# Patient Record
Sex: MALE | Race: WHITE | Employment: OTHER
[De-identification: names, ages, dates, MRNs, and addresses within clinical notes are randomized per-mention and may not be internally consistent; named-entity substitution may affect disease eponyms.]

---

## 2017-01-17 ENCOUNTER — TELEPHONE (OUTPATIENT)
Dept: CASE MANAGEMENT | Age: 67
End: 2017-01-17

## 2017-07-24 ENCOUNTER — TELEPHONE (OUTPATIENT)
Dept: FAMILY MEDICINE CLINIC | Age: 67
End: 2017-07-24

## 2017-07-24 ENCOUNTER — TELEPHONE (OUTPATIENT)
Dept: LAB | Age: 67
End: 2017-07-24

## 2017-07-24 ENCOUNTER — TELEPHONE (OUTPATIENT)
Dept: CASE MANAGEMENT | Age: 67
End: 2017-07-24

## 2017-07-25 ENCOUNTER — TELEPHONE (OUTPATIENT)
Dept: CASE MANAGEMENT | Age: 67
End: 2017-07-25

## 2017-08-01 ENCOUNTER — TELEPHONE (OUTPATIENT)
Dept: CASE MANAGEMENT | Age: 67
End: 2017-08-01

## 2018-08-06 ENCOUNTER — TELEPHONE (OUTPATIENT)
Dept: ONCOLOGY | Age: 68
End: 2018-08-06

## 2018-08-06 DIAGNOSIS — Z87.891 PERSONAL HISTORY OF TOBACCO USE: Primary | ICD-10-CM

## 2018-08-06 NOTE — TELEPHONE ENCOUNTER
Our records indicate that your patient is overdue for their annual lung cancer screening follow up testing. For your convenience, we have pended the order for the scan for you. If you do not agree with the need for the test, please cancel the order and let us know.      Sincerely,    [id number] [facility]

## 2018-08-29 ENCOUNTER — HOSPITAL ENCOUNTER (OUTPATIENT)
Dept: CT IMAGING | Age: 68
Discharge: HOME | End: 2018-08-31
Payer: MEDICARE

## 2018-08-29 DIAGNOSIS — Z87.891 PERSONAL HISTORY OF TOBACCO USE: ICD-10-CM

## 2018-08-29 PROCEDURE — G0297 LDCT FOR LUNG CA SCREEN: HCPCS

## 2018-08-31 ENCOUNTER — OFFICE VISIT (OUTPATIENT)
Dept: FAMILY MEDICINE CLINIC | Age: 68
End: 2018-08-31
Payer: MEDICARE

## 2018-08-31 VITALS
RESPIRATION RATE: 16 BRPM | HEIGHT: 70 IN | HEART RATE: 79 BPM | BODY MASS INDEX: 24.94 KG/M2 | OXYGEN SATURATION: 97 % | SYSTOLIC BLOOD PRESSURE: 130 MMHG | WEIGHT: 174.2 LBS | DIASTOLIC BLOOD PRESSURE: 78 MMHG | TEMPERATURE: 97.4 F

## 2018-08-31 DIAGNOSIS — F17.200 SMOKER: ICD-10-CM

## 2018-08-31 DIAGNOSIS — R06.02 SHORTNESS OF BREATH: Primary | ICD-10-CM

## 2018-08-31 DIAGNOSIS — I25.2 HISTORY OF MI (MYOCARDIAL INFARCTION): ICD-10-CM

## 2018-08-31 DIAGNOSIS — I45.10 RIGHT BUNDLE BRANCH BLOCK (RBBB) DETERMINED BY ELECTROCARDIOGRAPHY: ICD-10-CM

## 2018-08-31 PROCEDURE — 1101F PT FALLS ASSESS-DOCD LE1/YR: CPT | Performed by: NURSE PRACTITIONER

## 2018-08-31 PROCEDURE — 99214 OFFICE O/P EST MOD 30 MIN: CPT | Performed by: NURSE PRACTITIONER

## 2018-08-31 PROCEDURE — 4004F PT TOBACCO SCREEN RCVD TLK: CPT | Performed by: NURSE PRACTITIONER

## 2018-08-31 PROCEDURE — G8427 DOCREV CUR MEDS BY ELIG CLIN: HCPCS | Performed by: NURSE PRACTITIONER

## 2018-08-31 PROCEDURE — 4040F PNEUMOC VAC/ADMIN/RCVD: CPT | Performed by: NURSE PRACTITIONER

## 2018-08-31 PROCEDURE — 3017F COLORECTAL CA SCREEN DOC REV: CPT | Performed by: NURSE PRACTITIONER

## 2018-08-31 PROCEDURE — 1123F ACP DISCUSS/DSCN MKR DOCD: CPT | Performed by: NURSE PRACTITIONER

## 2018-08-31 PROCEDURE — G8419 CALC BMI OUT NRM PARAM NOF/U: HCPCS | Performed by: NURSE PRACTITIONER

## 2018-08-31 RX ORDER — BUPROPION HYDROCHLORIDE 150 MG/1
TABLET, EXTENDED RELEASE ORAL
Qty: 60 TABLET | Refills: 3 | Status: SHIPPED | OUTPATIENT
Start: 2018-08-31 | End: 2019-04-12 | Stop reason: SDUPTHER

## 2018-08-31 RX ORDER — ALBUTEROL SULFATE 90 UG/1
2 AEROSOL, METERED RESPIRATORY (INHALATION) EVERY 6 HOURS PRN
Qty: 1 INHALER | Refills: 3 | Status: SHIPPED | OUTPATIENT
Start: 2018-08-31

## 2018-08-31 ASSESSMENT — ENCOUNTER SYMPTOMS
CHOKING: 0
COUGH: 1
GASTROINTESTINAL NEGATIVE: 1
SHORTNESS OF BREATH: 1
CHEST TIGHTNESS: 0
SPUTUM PRODUCTION: 1

## 2018-08-31 NOTE — PATIENT INSTRUCTIONS
Patient Education        Stopping Smoking: Care Instructions  Your Care Instructions  Cigarette smokers crave the nicotine in cigarettes. Giving it up is much harder than simply changing a habit. Your body has to stop craving the nicotine. It is hard to quit, but you can do it. There are many tools that people use to quit smoking. You may find that combining tools works best for you. There are several steps to quitting. First you get ready to quit. Then you get support to help you. After that, you learn new skills and behaviors to become a nonsmoker. For many people, a necessary step is getting and using medicine. Your doctor will help you set up the plan that best meets your needs. You may want to attend a smoking cessation program to help you quit smoking. When you choose a program, look for one that has proven success. Ask your doctor for ideas. You will greatly increase your chances of success if you take medicine as well as get counseling or join a cessation program.  Some of the changes you feel when you first quit tobacco are uncomfortable. Your body will miss the nicotine at first, and you may feel short-tempered and grumpy. You may have trouble sleeping or concentrating. Medicine can help you deal with these symptoms. You may struggle with changing your smoking habits and rituals. The last step is the tricky one: Be prepared for the smoking urge to continue for a time. This is a lot to deal with, but keep at it. You will feel better. Follow-up care is a key part of your treatment and safety. Be sure to make and go to all appointments, and call your doctor if you are having problems. It's also a good idea to know your test results and keep a list of the medicines you take. How can you care for yourself at home? · Ask your family, friends, and coworkers for support. You have a better chance of quitting if you have help and support.   · Join a support group, such as Nicotine Anonymous, for people who are trying to quit smoking. · Consider signing up for a smoking cessation program, such as the American Lung Association's Freedom from Smoking program.  · Get text messaging support. Go to the website at www.smokefree. gov to sign up for the  program.  · Set a quit date. Pick your date carefully so that it is not right in the middle of a big deadline or stressful time. Once you quit, do not even take a puff. Get rid of all ashtrays and lighters after your last cigarette. Clean your house and your clothes so that they do not smell of smoke. · Learn how to be a nonsmoker. Think about ways you can avoid those things that make you reach for a cigarette. ¨ Avoid situations that put you at greatest risk for smoking. For some people, it is hard to have a drink with friends without smoking. For others, they might skip a coffee break with coworkers who smoke. ¨ Change your daily routine. Take a different route to work or eat a meal in a different place. · Cut down on stress. Calm yourself or release tension by doing an activity you enjoy, such as reading a book, taking a hot bath, or gardening. · Talk to your doctor or pharmacist about nicotine replacement therapy, which replaces the nicotine in your body. You still get nicotine but you do not use tobacco. Nicotine replacement products help you slowly reduce the amount of nicotine you need. These products come in several forms, many of them available over-the-counter:  ¨ Nicotine patches  ¨ Nicotine gum and lozenges  ¨ Nicotine inhaler  · Ask your doctor about bupropion (Wellbutrin) or varenicline (Chantix), which are prescription medicines. They do not contain nicotine. They help you by reducing withdrawal symptoms, such as stress and anxiety. · Some people find hypnosis, acupuncture, and massage helpful for ending the smoking habit. · Eat a healthy diet and get regular exercise.  Having healthy habits will help your body move past its craving for plenty of rest and sleep. · Take your medicines exactly as prescribed. Call your doctor if you think you are having a problem with your medicine. · Find healthy ways to deal with stress. ¨ Exercise daily. ¨ Get plenty of sleep. ¨ Eat regularly and well. When should you call for help? Call 911 anytime you think you may need emergency care. For example, call if:    · You have severe shortness of breath.     · You have symptoms of a heart attack. These may include:  ¨ Chest pain or pressure, or a strange feeling in the chest.  ¨ Sweating. ¨ Shortness of breath. ¨ Nausea or vomiting. ¨ Pain, pressure, or a strange feeling in the back, neck, jaw, or upper belly or in one or both shoulders or arms. ¨ Lightheadedness or sudden weakness. ¨ A fast or irregular heartbeat. After you call 911, the  may tell you to chew 1 adult-strength or 2 to 4 low-dose aspirin. Wait for an ambulance. Do not try to drive yourself.    Call your doctor now or seek immediate medical care if:    · Your shortness of breath gets worse or you start to wheeze. Wheezing is a high-pitched sound when you breathe.     · You wake up at night out of breath or have to prop your head up on several pillows to breathe.     · You are short of breath after only light activity or while at rest.    Watch closely for changes in your health, and be sure to contact your doctor if:    · You do not get better over the next 1 to 2 days. Where can you learn more? Go to https://[employer]. org and sign in to your [facility] account. Enter S780 in the [employer] box to learn more about \"Shortness of Breath: Care Instructions. \"     If you do not have an account, please click on the \"Sign Up Now\" link. Current as of: December 6, 2017  Content Version: 11.7  © 1752-4453 [employer]. Care instructions adapted under license by [facility] ([facility]).  If you have questions about a medical condition or this instruction,

## 2018-08-31 NOTE — PROGRESS NOTES
Opinion     Referral Reason:   Specialty Services Required     Requested Specialty:   Cardiology     Number of Visits Requested:   1    EKG 12 Lead     Order Specific Question:   Reason for Exam?     Answer:   Shortness of Breath       I discussed with patient and his wife going to the ER given his symptoms and medical history. Patient refuses. I discussed that doing an EKG today does not rule out a myocardial infarction and he again refuses. Patient will see cardiology in [facility]. He is going to try and quit smoking. We will further address his SOB once he has had his cardiology evaluation. Patient given educational materials - see patient instructions. Discussed use, benefit, and side effects of prescribed medications. All patient questions answered. Pt voiced understanding. Reviewed health maintenance. Instructed to continue current medications, diet and exercise.       Electronically signed by AIMEE Sommer CNP on 8/31/2018 at 5:39 PM          AIMEE Sommer CNP

## 2018-09-10 ENCOUNTER — OFFICE VISIT (OUTPATIENT)
Dept: CARDIOLOGY CLINIC | Age: 68
End: 2018-09-10
Payer: MEDICARE

## 2018-09-10 VITALS
BODY MASS INDEX: 25.2 KG/M2 | WEIGHT: 176 LBS | HEIGHT: 70 IN | SYSTOLIC BLOOD PRESSURE: 148 MMHG | DIASTOLIC BLOOD PRESSURE: 82 MMHG | HEART RATE: 96 BPM

## 2018-09-10 DIAGNOSIS — R07.89 OTHER CHEST PAIN: ICD-10-CM

## 2018-09-10 DIAGNOSIS — I45.10 RBBB: ICD-10-CM

## 2018-09-10 DIAGNOSIS — R94.31 ABNORMAL ECG: Primary | ICD-10-CM

## 2018-09-10 DIAGNOSIS — R06.09 DOE (DYSPNEA ON EXERTION): ICD-10-CM

## 2018-09-10 PROCEDURE — 99203 OFFICE O/P NEW LOW 30 MIN: CPT | Performed by: INTERNAL MEDICINE

## 2018-09-10 NOTE — PROGRESS NOTES
Cardiology Consultation  [facility] [place], Reji Wilkinson)    09/10/18    Patient is here to establish care for LEDESMA, CPOE    HPI and Chief Complaint:  Bharat Martinez.  is doing well from a cardiac standpoint. Average to Good functional capacity with recent decline in functional capacity. Has been getting very short of breath and has chest tightness, especially with exertion. REVIEW OF SYSTEMS:    · Constitutional: there has been no unanticipated weight loss. There's been No change in energy level, No change in activity level. · Eyes: No visual changes or diplopia. No scleral icterus. · ENT: No Headaches, hearing loss or vertigo. No mouth sores or sore throat. · Cardiovascular: CPOE, LEDESMA  · Respiratory: SOB, LEDESMA  · Gastrointestinal: No abdominal pain, appetite loss, blood in stools. No change in bowel or bladder habits. · Genitourinary: No dysuria, trouble voiding, or hematuria. · Musculoskeletal:  No gait disturbance, No weakness or joint complaints. · Integumentary: No rash or pruritis. · Neurological: No headache, diplopia, change in muscle strength, numbness or tingling. No change in gait, balance, coordination, mood, affect, memory, mentation, behavior. · Psychiatric: No new anxiety or depression. · Endocrine: No temperature intolerance. No excessive thirst, fluid intake, or urination. No tremor. · Hematologic/Lymphatic: No abnormal bruising or bleeding, blood clots or swollen lymph nodes. · Allergic/Immunologic: No nasal congestion or hives. Physical Exam:   Vitals: BP (!) 148/82   Pulse 96   Ht 5' 10\" (1.778 m)   Wt 176 lb (79.8 kg)   BMI 25.25 kg/m²   General appearance: alert and cooperative with exam  HEENT: Head: Normocephalic, no lesions, without obvious abnormality.   Neck: no carotid bruit, no JVD  Lungs: clear to auscultation bilaterally  Heart: regular rate and rhythm, S1, S2 normal, no murmur, click, rub or gallop  Abdomen: soft, non-tender;

## 2018-09-12 LAB
LV EF: 69 %
LVEF MODALITY: NORMAL

## 2018-09-13 ENCOUNTER — TELEPHONE (OUTPATIENT)
Dept: CARDIOLOGY CLINIC | Age: 68
End: 2018-09-13

## 2018-09-13 DIAGNOSIS — R06.09 DOE (DYSPNEA ON EXERTION): ICD-10-CM

## 2018-09-13 DIAGNOSIS — I45.10 RBBB: ICD-10-CM

## 2018-09-13 DIAGNOSIS — R94.31 ABNORMAL ECG: ICD-10-CM

## 2018-09-13 DIAGNOSIS — R07.89 OTHER CHEST PAIN: ICD-10-CM

## 2018-09-18 NOTE — TELEPHONE ENCOUNTER
Pt notified of normal stress test results. States understanding. Denies complaints. Await PFT results done the same day. Plan is to see Dr. Aj Sanders. Records sent per fax.

## 2018-10-01 ENCOUNTER — OFFICE VISIT (OUTPATIENT)
Dept: FAMILY MEDICINE CLINIC | Age: 68
End: 2018-10-01
Payer: MEDICARE

## 2018-10-01 VITALS
RESPIRATION RATE: 20 BRPM | DIASTOLIC BLOOD PRESSURE: 70 MMHG | OXYGEN SATURATION: 93 % | WEIGHT: 175.4 LBS | BODY MASS INDEX: 25.17 KG/M2 | SYSTOLIC BLOOD PRESSURE: 120 MMHG | HEART RATE: 93 BPM | TEMPERATURE: 97.2 F

## 2018-10-01 DIAGNOSIS — J44.9 COPD WITH ASTHMA (HCC): Primary | ICD-10-CM

## 2018-10-01 PROCEDURE — 1123F ACP DISCUSS/DSCN MKR DOCD: CPT | Performed by: NURSE PRACTITIONER

## 2018-10-01 PROCEDURE — 4040F PNEUMOC VAC/ADMIN/RCVD: CPT | Performed by: NURSE PRACTITIONER

## 2018-10-01 PROCEDURE — G8926 SPIRO NO PERF OR DOC: HCPCS | Performed by: NURSE PRACTITIONER

## 2018-10-01 PROCEDURE — 4004F PT TOBACCO SCREEN RCVD TLK: CPT | Performed by: NURSE PRACTITIONER

## 2018-10-01 PROCEDURE — G8419 CALC BMI OUT NRM PARAM NOF/U: HCPCS | Performed by: NURSE PRACTITIONER

## 2018-10-01 PROCEDURE — G8427 DOCREV CUR MEDS BY ELIG CLIN: HCPCS | Performed by: NURSE PRACTITIONER

## 2018-10-01 PROCEDURE — 1101F PT FALLS ASSESS-DOCD LE1/YR: CPT | Performed by: NURSE PRACTITIONER

## 2018-10-01 PROCEDURE — 3017F COLORECTAL CA SCREEN DOC REV: CPT | Performed by: NURSE PRACTITIONER

## 2018-10-01 PROCEDURE — 99213 OFFICE O/P EST LOW 20 MIN: CPT | Performed by: NURSE PRACTITIONER

## 2018-10-01 PROCEDURE — G8484 FLU IMMUNIZE NO ADMIN: HCPCS | Performed by: NURSE PRACTITIONER

## 2018-10-01 PROCEDURE — 3023F SPIROM DOC REV: CPT | Performed by: NURSE PRACTITIONER

## 2018-10-01 ASSESSMENT — ENCOUNTER SYMPTOMS
SINUS PAIN: 0
ALLERGIC/IMMUNOLOGIC NEGATIVE: 1
RHINORRHEA: 0
SINUS PRESSURE: 0
ABDOMINAL PAIN: 0
SHORTNESS OF BREATH: 1
CHEST TIGHTNESS: 1
EYES NEGATIVE: 1
COUGH: 1

## 2018-10-18 ENCOUNTER — OFFICE VISIT (OUTPATIENT)
Dept: CARDIOLOGY CLINIC | Age: 68
End: 2018-10-18
Payer: MEDICARE

## 2018-10-18 VITALS
SYSTOLIC BLOOD PRESSURE: 134 MMHG | BODY MASS INDEX: 24.91 KG/M2 | HEIGHT: 70 IN | HEART RATE: 84 BPM | WEIGHT: 174 LBS | DIASTOLIC BLOOD PRESSURE: 80 MMHG

## 2018-10-18 DIAGNOSIS — R06.02 SOB (SHORTNESS OF BREATH): ICD-10-CM

## 2018-10-18 DIAGNOSIS — I73.9 CLAUDICATION (HCC): Primary | ICD-10-CM

## 2018-10-18 PROCEDURE — 99213 OFFICE O/P EST LOW 20 MIN: CPT | Performed by: INTERNAL MEDICINE

## 2018-10-18 RX ORDER — ALPRAZOLAM 0.25 MG/1
0.25 TABLET ORAL NIGHTLY PRN
COMMUNITY
End: 2019-09-16 | Stop reason: ALTCHOICE

## 2018-10-18 NOTE — PROGRESS NOTES
Provider, MD   verapamil (CALAN-SR) 180 MG CR tablet Take 180 mg by mouth daily. Historical Provider, MD   lovastatin (MEVACOR) 10 MG tablet Take 10 mg by mouth daily. Historical Provider, MD   aspirin 81 MG tablet Take 81 mg by mouth daily. Historical Provider, MD       Allergies:  Diclofenac sodium    Social History:   reports that he has been smoking Cigarettes. He has a 20.00 pack-year smoking history. He has never used smokeless tobacco. He reports that he drinks about 7.0 oz of alcohol per week . REVIEW OF SYSTEMS:  CONSTITUTIONAL:NEGATIVE  HEENT:NEG  Cardiovascular: No chest pain, Yes dyspnea on exertion, No palpitations. Lower extremity edema: No  RESPIRATORY: LEDESMA  GASTROINTESTINAL:  positive for reflux  GENITOURINARY:  negative  INTEGUMENT:  negative  MUSCULOSKELETAL:  positive for  pain  NEUROLOGICAL:  negative    PHYSICAL EXAM:      There were no vitals taken for this visit. HEENT: PERRL, no cervical lymphadenopathy. No masses palpable. Cardiovascular:  · The apical impulse is not displaced  · Heart  Sounds:RRR, S4  · Jugular venous pulsation Normal  · The carotid upstroke is NL  · Peripheral pulses are symmetrical and full  Respiratory: Good respiratory effort. On auscultation: clear to auscultation bilaterally  Abdomen:  · No masses or tenderness  · Bowel sounds present  Extremities:  ·  No Cyanosis or Clubbing  ·  Lower extremity edema: No  Skin: Warm and dry    Cardiac data:      Labs:     CBC: No results for input(s): WBC, HGB, HCT, PLT in the last 72 hours. BMP: No results for input(s): NA, K, CO2, BUN, CREATININE, LABGLOM, GLUCOSE in the last 72 hours. PT/INR: No results for input(s): PROTIME, INR in the last 72 hours. FASTING LIPID PANEL:No results found for: HDL, LDLDIRECT, LDLCALC, TRIG  LIVER PROFILE:No results for input(s): AST, ALT, LABALBU in the last 72 hours. IMPRESSION:    1. LEDESMA, NEGATIVE STRESS TEST 9/20108  2. COPD  3. RBBB  4. HTN  5. Obesity   6.  Tobacco

## 2018-11-05 ENCOUNTER — TELEPHONE (OUTPATIENT)
Dept: CARDIOLOGY CLINIC | Age: 68
End: 2018-11-05

## 2018-11-05 NOTE — TELEPHONE ENCOUNTER
Pt notified of normal krupa leg Duplex US and stress test results. He states understanding and confirmed follow up cardio appt on 11/29/18 at [facility].

## 2018-11-29 ENCOUNTER — OFFICE VISIT (OUTPATIENT)
Dept: CARDIOLOGY CLINIC | Age: 68
End: 2018-11-29
Payer: MEDICARE

## 2018-11-29 VITALS
BODY MASS INDEX: 25.05 KG/M2 | HEIGHT: 70 IN | SYSTOLIC BLOOD PRESSURE: 120 MMHG | WEIGHT: 175 LBS | DIASTOLIC BLOOD PRESSURE: 80 MMHG | HEART RATE: 102 BPM

## 2018-11-29 DIAGNOSIS — R06.09 DOE (DYSPNEA ON EXERTION): ICD-10-CM

## 2018-11-29 DIAGNOSIS — R94.31 ABNORMAL ECG: ICD-10-CM

## 2018-11-29 DIAGNOSIS — R07.89 OTHER CHEST PAIN: ICD-10-CM

## 2018-11-29 DIAGNOSIS — I45.10 RBBB: Primary | ICD-10-CM

## 2018-11-29 DIAGNOSIS — J44.9 CHRONIC OBSTRUCTIVE PULMONARY DISEASE, UNSPECIFIED COPD TYPE (HCC): ICD-10-CM

## 2018-11-29 PROCEDURE — 99213 OFFICE O/P EST LOW 20 MIN: CPT | Performed by: INTERNAL MEDICINE

## 2018-11-29 RX ORDER — THEOPHYLLINE 300 MG/1
1 TABLET, EXTENDED RELEASE ORAL 2 TIMES DAILY
COMMUNITY

## 2018-11-29 NOTE — PROGRESS NOTES
Today's Date: 11/29/2018  Patient Name: Tra Avendaño. Patient's age: 77 y.o., 1950          The patient is a 77 y.o.  male is in the office for f/u after a negative stress test on 9/12/2018. He is scheduled to see pulmonary later. No chest pain. He has claudication. Past Medical History:   has a past medical history of COPD (chronic obstructive pulmonary disease) ([facility] Fracture of ulnar styloid; Headache(784.0); Hyperlipidemia; Hypertension; MI (myocardial infarction) ([facility] Neck pain, chronic; Posttraumatic stress disorder; and Tremor. Past Surgical History:   has a past surgical history that includes Appendectomy; Vasectomy; Tonsillectomy; Elbow surgery (Left, 1999); Cardiac catheterization (7/10); and Colonoscopy (2015). Home Medications:    Prior to Admission medications    Medication Sig Start Date End Date Taking? Authorizing Provider   theophylline (THEODUR) 300 MG extended release tablet Take 1 tablet by mouth 2 times daily   Yes Historical Provider, MD   Budesonide-Formoterol Fumarate (SYMBICORT IN) Inhale 2 puffs into the lungs daily   Yes Historical Provider, MD   ALPRAZolam (XANAX) 0.25 MG tablet Take 0.25 mg by mouth nightly as needed for Sleep. .   Yes Historical Provider, MD   albuterol sulfate HFA (PROAIR HFA) 108 (90 Base) MCG/ACT inhaler Inhale 2 puffs into the lungs every 6 hours as needed for Wheezing or Shortness of Breath 8/31/18  Yes AIMEE Graham CNP   buPROPion [facility] SR) 150 MG extended release tablet Take 1 PO daily for 3 days then increase to 1 PO BID. Stop smoking after being on the medication for 5-7 days.  8/31/18  Yes AIMEE Graham CNP   Cyanocobalamin (VITAMIN B-12) 1000 MCG extended release tablet Take 1,500 mcg by mouth daily   Yes Historical Provider, MD   Multiple Vitamins-Minerals (MULTIVITAMIN & MINERAL PO) Take 1 tablet by mouth Indications: When he remembers   Yes Historical Provider, MD   MELOXICAM CREATININE, LABGLOM, GLUCOSE in the last 72 hours. PT/INR: No results for input(s): PROTIME, INR in the last 72 hours. FASTING LIPID PANEL:No results found for: HDL, LDLDIRECT, LDLCALC, TRIG  LIVER PROFILE:No results for input(s): AST, ALT, LABALBU in the last 72 hours. Patient Active Problem List   Diagnosis    Abnormal ECG    RBBB    Other chest pain    LEDESMA (dyspnea on exertion)     Echo 11/5/18   The Ejection Fraction is 60-65%.     Grade I diastolic dysfunction.     The right ventricle structure and function is normal.     Mitral regurgitation is trace.     There is mild tricuspid regurgitation.     The aortic valve is mildly sclerotic.     There is mild aortic root dilatation.     There is no pericardial effusion.      NINO 11/13/18  Normal    Stress 9/12/18  Normal perfusion  EF 69%    PFTs: 9/12/18  Mild obstructive disease and Asthmatic response      IMPRESSION & RECOMMENDATIONS:  1. LEDESMA- has mild asthma/copd. On meds and inhales now. Feels better. Seeing Dr. Joel Bartlett for this. 2. NEGATIVE STRESS TEST 9/20108  3. COPD  4. RBBB  5. HTN  6. Obesity   7. Tobacco abuse  8. Hyperlipidemia  9. Claudication- neg nino      Thank you for allowing me to participate in the care of this patient, please do not hesitate to call if you have any questions. Amadeo Alarcon, [id number] [facility]  [facility]  [phone]

## 2019-04-12 ENCOUNTER — OFFICE VISIT (OUTPATIENT)
Dept: FAMILY MEDICINE CLINIC | Age: 69
End: 2019-04-12
Payer: MEDICARE

## 2019-04-12 ENCOUNTER — HOSPITAL ENCOUNTER (OUTPATIENT)
Age: 69
Setting detail: SPECIMEN
Discharge: HOME | End: 2019-04-12
Payer: MEDICARE

## 2019-04-12 VITALS
WEIGHT: 173.2 LBS | OXYGEN SATURATION: 95 % | DIASTOLIC BLOOD PRESSURE: 78 MMHG | TEMPERATURE: 97.4 F | RESPIRATION RATE: 16 BRPM | BODY MASS INDEX: 24.85 KG/M2 | HEART RATE: 104 BPM | SYSTOLIC BLOOD PRESSURE: 136 MMHG

## 2019-04-12 DIAGNOSIS — L71.9 ROSACEA, UNSPECIFIED: Primary | ICD-10-CM

## 2019-04-12 DIAGNOSIS — F32.A DEPRESSION, UNSPECIFIED DEPRESSION TYPE: ICD-10-CM

## 2019-04-12 DIAGNOSIS — M79.671 BILATERAL FOOT PAIN: ICD-10-CM

## 2019-04-12 DIAGNOSIS — M79.672 BILATERAL FOOT PAIN: ICD-10-CM

## 2019-04-12 DIAGNOSIS — F17.200 SMOKER: ICD-10-CM

## 2019-04-12 DIAGNOSIS — J44.9 CHRONIC OBSTRUCTIVE PULMONARY DISEASE, UNSPECIFIED COPD TYPE (HCC): ICD-10-CM

## 2019-04-12 LAB
ABSOLUTE EOS #: 0.5 K/UL (ref 0–0.4)
ABSOLUTE IMMATURE GRANULOCYTE: ABNORMAL K/UL (ref 0–0.3)
ABSOLUTE LYMPH #: 2 K/UL (ref 1–4.8)
ABSOLUTE MONO #: 0.8 K/UL (ref 0.1–1.2)
ALBUMIN SERPL-MCNC: 4.8 G/DL (ref 3.5–5.2)
ALBUMIN/GLOBULIN RATIO: 1.7 (ref 1–2.5)
ALP BLD-CCNC: 72 U/L (ref 40–129)
ALT SERPL-CCNC: 15 U/L (ref 5–41)
ANION GAP SERPL CALCULATED.3IONS-SCNC: 12 MMOL/L (ref 9–17)
AST SERPL-CCNC: 18 U/L
BASOPHILS # BLD: 1 % (ref 0–2)
BASOPHILS ABSOLUTE: 0.1 K/UL (ref 0–0.2)
BILIRUB SERPL-MCNC: 0.43 MG/DL (ref 0.3–1.2)
BUN BLDV-MCNC: 7 MG/DL (ref 8–23)
BUN/CREAT BLD: 8 (ref 9–20)
CALCIUM SERPL-MCNC: 10 MG/DL (ref 8.6–10.4)
CHLORIDE BLD-SCNC: 99 MMOL/L (ref 98–107)
CO2: 28 MMOL/L (ref 20–31)
CREAT SERPL-MCNC: 0.85 MG/DL (ref 0.7–1.2)
DIFFERENTIAL TYPE: ABNORMAL
EOSINOPHILS RELATIVE PERCENT: 6 % (ref 1–8)
GFR AFRICAN AMERICAN: >60 ML/MIN
GFR NON-AFRICAN AMERICAN: >60 ML/MIN
GFR SERPL CREATININE-BSD FRML MDRD: ABNORMAL ML/MIN/{1.73_M2}
GFR SERPL CREATININE-BSD FRML MDRD: ABNORMAL ML/MIN/{1.73_M2}
GLUCOSE BLD-MCNC: 117 MG/DL (ref 70–99)
HCT VFR BLD CALC: 50.5 % (ref 41–53)
HEMOGLOBIN: 16.2 G/DL (ref 13.5–17.5)
IMMATURE GRANULOCYTES: ABNORMAL %
LYMPHOCYTES # BLD: 23 % (ref 15–43)
MCH RBC QN AUTO: 31.1 PG (ref 26–34)
MCHC RBC AUTO-ENTMCNC: 32.2 G/DL (ref 31–37)
MCV RBC AUTO: 96.6 FL (ref 80–100)
MONOCYTES # BLD: 9 % (ref 6–14)
NRBC AUTOMATED: ABNORMAL PER 100 WBC
PDW BLD-RTO: 14.1 % (ref 11–14.5)
PLATELET # BLD: 564 K/UL (ref 140–450)
PLATELET ESTIMATE: ABNORMAL
PMV BLD AUTO: 7.6 FL (ref 6–12)
POTASSIUM SERPL-SCNC: 4.8 MMOL/L (ref 3.7–5.3)
RBC # BLD: 5.23 M/UL (ref 4.5–5.9)
RBC # BLD: ABNORMAL 10*6/UL
SEG NEUTROPHILS: 61 % (ref 44–74)
SEGMENTED NEUTROPHILS ABSOLUTE COUNT: 5.3 K/UL (ref 1.8–7.7)
SODIUM BLD-SCNC: 139 MMOL/L (ref 135–144)
TOTAL PROTEIN: 7.6 G/DL (ref 6.4–8.3)
WBC # BLD: 8.7 K/UL (ref 3.5–11)
WBC # BLD: ABNORMAL 10*3/UL

## 2019-04-12 PROCEDURE — 3017F COLORECTAL CA SCREEN DOC REV: CPT | Performed by: NURSE PRACTITIONER

## 2019-04-12 PROCEDURE — 80053 COMPREHEN METABOLIC PANEL: CPT

## 2019-04-12 PROCEDURE — 99214 OFFICE O/P EST MOD 30 MIN: CPT | Performed by: NURSE PRACTITIONER

## 2019-04-12 PROCEDURE — G8420 CALC BMI NORM PARAMETERS: HCPCS | Performed by: NURSE PRACTITIONER

## 2019-04-12 PROCEDURE — 4040F PNEUMOC VAC/ADMIN/RCVD: CPT | Performed by: NURSE PRACTITIONER

## 2019-04-12 PROCEDURE — 85025 COMPLETE CBC W/AUTO DIFF WBC: CPT

## 2019-04-12 PROCEDURE — 3023F SPIROM DOC REV: CPT | Performed by: NURSE PRACTITIONER

## 2019-04-12 PROCEDURE — 82607 VITAMIN B-12: CPT

## 2019-04-12 PROCEDURE — G8926 SPIRO NO PERF OR DOC: HCPCS | Performed by: NURSE PRACTITIONER

## 2019-04-12 PROCEDURE — 1123F ACP DISCUSS/DSCN MKR DOCD: CPT | Performed by: NURSE PRACTITIONER

## 2019-04-12 PROCEDURE — G8427 DOCREV CUR MEDS BY ELIG CLIN: HCPCS | Performed by: NURSE PRACTITIONER

## 2019-04-12 PROCEDURE — 36415 COLL VENOUS BLD VENIPUNCTURE: CPT

## 2019-04-12 PROCEDURE — 1036F TOBACCO NON-USER: CPT | Performed by: NURSE PRACTITIONER

## 2019-04-12 RX ORDER — METRONIDAZOLE 7.5 MG/G
GEL TOPICAL
Qty: 45 G | Refills: 3 | Status: SHIPPED | OUTPATIENT
Start: 2019-04-12

## 2019-04-12 RX ORDER — BUPROPION HYDROCHLORIDE 150 MG/1
TABLET, EXTENDED RELEASE ORAL
Qty: 60 TABLET | Refills: 3 | Status: SHIPPED | OUTPATIENT
Start: 2019-04-12 | End: 2019-11-19 | Stop reason: SDUPTHER

## 2019-04-12 ASSESSMENT — PATIENT HEALTH QUESTIONNAIRE - PHQ9
5. POOR APPETITE OR OVEREATING: 0
1. LITTLE INTEREST OR PLEASURE IN DOING THINGS: 2
SUM OF ALL RESPONSES TO PHQ QUESTIONS 1-9: 6
SUM OF ALL RESPONSES TO PHQ9 QUESTIONS 1 & 2: 3
SUM OF ALL RESPONSES TO PHQ QUESTIONS 1-9: 6
4. FEELING TIRED OR HAVING LITTLE ENERGY: 1
3. TROUBLE FALLING OR STAYING ASLEEP: 2
2. FEELING DOWN, DEPRESSED OR HOPELESS: 1

## 2019-04-12 ASSESSMENT — ENCOUNTER SYMPTOMS
SHORTNESS OF BREATH: 0
ABDOMINAL PAIN: 0
COLOR CHANGE: 1
COUGH: 0

## 2019-04-12 NOTE — PROGRESS NOTES
2019     Shreya Wu (:  1950) is a 77 y.o. male, here for evaluation of the following medical concerns:    HPI  He is here today for pain in his bilateral feet. He states that it feels like he has hang nails on all of his toes and he is walking with balls in his feet. He was given Mobic and Capsaicin which he does not feel has helped. He is not diabetic (although I do not have any current blood work). He does drink anywhere from 6-10 beers a day but patient states he has cut back to 6 beers a day. He has been drinking this amount for a long time. He is also complaining of the way his nose looks. It does not bother him but it bothers his wife. It is more red and looks larger to him. Review of Systems   Constitutional: Negative for fatigue and fever. Eyes: Negative for visual disturbance. Respiratory: Negative for cough and shortness of breath. Cardiovascular: Negative for chest pain and palpitations. Gastrointestinal: Negative for abdominal pain. Musculoskeletal: Positive for arthralgias and myalgias. Skin: Positive for color change. Negative for rash. Neurological: Negative for dizziness and light-headedness. Psychiatric/Behavioral: Positive for dysphoric mood. The patient is nervous/anxious. Quit smoking previously with Wellbutrin and liked how he did with his mood on it. Prior to Visit Medications    Medication Sig Taking?  Authorizing Provider   theophylline (THEODUR) 300 MG extended release tablet Take 1 tablet by mouth 2 times daily Yes Historical Provider, MD   Budesonide-Formoterol Fumarate (SYMBICORT IN) Inhale 2 puffs into the lungs daily Yes Historical Provider, MD   albuterol sulfate HFA (PROAIR HFA) 108 (90 Base) MCG/ACT inhaler Inhale 2 puffs into the lungs every 6 hours as needed for Wheezing or Shortness of Breath Yes Jose Hart APRN - CNP   Cyanocobalamin (VITAMIN B-12) 1000 MCG extended release tablet Take 1,500 mcg by mouth daily Yes Historical Provider, MD   Multiple Vitamins-Minerals (MULTIVITAMIN & MINERAL PO) Take 1 tablet by mouth Indications: When he remembers Yes Historical Provider, MD   sildenafil (VIAGRA) 50 MG tablet Take 1 tablet by mouth as needed for Erectile Dysfunction Yes AIMEE Jameson CNP   MELOXICAM PO Take 7.5 mg by mouth daily  Yes Historical Provider, MD   hypromellose 0.4 % SOLN ophthalmic solution Place 1 drop into both eyes 3 times daily Yes Historical Provider, MD   verapamil (CALAN-SR) 180 MG CR tablet Take 180 mg by mouth daily. Yes Historical Provider, MD   aspirin 81 MG tablet Take 81 mg by mouth daily. Yes Historical Provider, MD   ALPRAZolam Audria Husbands) 0.25 MG tablet Take 0.25 mg by mouth nightly as needed for Sleep. Jayne Cedeno Historical Provider, MD   buPROPion [facility] SR) 150 MG extended release tablet Take 1 PO daily for 3 days then increase to 1 PO BID. Stop smoking after being on the medication for 5-7 days. AIMEE Jameson CNP   lovastatin (MEVACOR) 10 MG tablet Take 10 mg by mouth daily. Historical Provider, MD        Social History     Tobacco Use    Smoking status: Former Smoker     Packs/day: 0.50     Years: 40.00     Pack years: 20.00     Types: Cigarettes     Last attempt to quit: 10/11/2018     Years since quittin.5    Smokeless tobacco: Never Used    Tobacco comment: uses nicorette   Substance Use Topics    Alcohol use: Yes     Alcohol/week: 7.0 oz     Types: 14 Standard drinks or equivalent per week        Vitals:    19 1108   BP: 136/78   Site: Right Upper Arm   Position: Sitting   Cuff Size: Medium Adult   Pulse: 104   Resp: 16   Temp: 97.4 °F (36.3 °C)   TempSrc: Tympanic   SpO2: 95%   Weight: 173 lb 3.2 oz (78.6 kg)     Estimated body mass index is 24.85 kg/m² as calculated from the following:    Height as of 18: 5' 10\" (1.778 m). Weight as of this encounter: 173 lb 3.2 oz (78.6 kg).     Physical Exam   Constitutional: He is oriented to person, place, and time. He appears well-developed and well-nourished. HENT:   Head: Normocephalic and atraumatic. Eyes: Conjunctivae are normal.   Cardiovascular: Normal rate. Pulmonary/Chest: Effort normal and breath sounds normal.   Musculoskeletal:   Monofilament foot exam is normal.  He has tenderness bilateral on the plantar distal parts of his feet. Full ROM   Neurological: He is alert and oriented to person, place, and time. Skin: Skin is warm and dry. Psychiatric: He has a normal mood and affect. His behavior is normal. Judgment and thought content normal.   Nursing note and vitals reviewed. ASSESSMENT/PLAN:  1. Rosacea, unspecified  - metroNIDAZOLE (METROGEL) 0.75 % gel; Apply topically 2 times daily. Dispense: 45 g; Refill: 3    2. Smoker  - Recently quit in the last 6 months    3. Depression, unspecified depression type  - buPROPion (WELLBUTRIN SR) 150 MG extended release tablet; Take 1 PO daily for 3 days then increase to 1 PO BID. Dispense: 60 tablet; Refill: 3    4. Bilateral foot pain  - Vitamin B12; Future  - Comprehensive Metabolic Panel; Future  - CBC Auto Differential; Future  - Patient is encouraged to cut back on alcohol intake  - If B12 is normal, I would like for patient to see a podiatrist    5. Chronic obstructive pulmonary disease, unspecified [facility]    Return in about 5 months (around 9/12/2019), or if symptoms worsen or fail to improve. Orders Placed This Encounter   Medications    metroNIDAZOLE (METROGEL) 0.75 % gel     Sig: Apply topically 2 times daily. Dispense:  45 g     Refill:  3    buPROPion (WELLBUTRIN SR) 150 MG extended release tablet     Sig: Take 1 PO daily for 3 days then increase to 1 PO BID.      Dispense:  60 tablet     Refill:  3     Orders Placed This Encounter   Procedures    Vitamin B12     Standing Status:   Future     Number of Occurrences:   1     Standing Expiration Date:   4/12/2020    Comprehensive Metabolic Panel     Standing Status:   Future     Number of Occurrences:   1     Standing Expiration Date:   4/12/2020    CBC Auto Differential     Standing Status:   Future     Number of Occurrences:   1     Standing Expiration Date:   4/12/2020       Patient given educational materials - see patient instructions. Discussed use, benefit, and side effects of prescribed medications. All patient questions answered. Pt voiced understanding. Reviewed health maintenance. Instructed to continue current medications, diet and exercise.       Electronically signed by AIMEE Jordan CNP on 4/12/2019 at 2:01 PM

## 2019-04-12 NOTE — PATIENT INSTRUCTIONS
Patient Education        Foot Pain: Care Instructions  Your Care Instructions  Foot injuries that cause pain and swelling are fairly common. Almost all sports or home repair projects can cause a misstep that ends up as foot pain. Normal wear and tear, especially as you get older, also can cause foot pain. Most minor foot injuries will heal on their own, and home treatment is usually all you need to do. If you have a severe injury, you may need tests and treatment. Follow-up care is a key part of your treatment and safety. Be sure to make and go to all appointments, and call your doctor if you are having problems. It's also a good idea to know your test results and keep a list of the medicines you take. How can you care for yourself at home? · Take pain medicines exactly as directed. ? If the doctor gave you a prescription medicine for pain, take it as prescribed. ? If you are not taking a prescription pain medicine, ask your doctor if you can take an over-the-counter medicine. · Rest and protect your foot. Take a break from any activity that may cause pain. · Put ice or a cold pack on your foot for 10 to 20 minutes at a time. Put a thin cloth between the ice and your skin. · Prop up the sore foot on a pillow when you ice it or anytime you sit or lie down during the next 3 days. Try to keep it above the level of your heart. This will help reduce swelling. · Your doctor may recommend that you wrap your foot with an elastic bandage. Keep your foot wrapped for as long as your doctor advises. · If your doctor recommends crutches, use them as directed. · Wear roomy footwear. · As soon as pain and swelling end, begin gentle exercises of your foot. Your doctor can tell you which exercises will help. When should you call for help? Call 911 anytime you think you may need emergency care.  For example, call if:    · Your foot turns pale, white, blue, or cold.    Call your doctor now or seek immediate medical care if:    · You cannot move or stand on your foot.     · Your foot looks twisted or out of its normal position.     · Your foot is not stable when you step down.     · You have signs of infection, such as:  ? Increased pain, swelling, warmth, or redness. ? Red streaks leading from the sore area. ? Pus draining from a place on your foot. ? A fever.     · Your foot is numb or tingly.    Watch closely for changes in your health, and be sure to contact your doctor if:    · You do not get better as expected.     · You have bruises from an injury that last longer than 2 weeks. Where can you learn more? Go to https://Property OwlpeQuantanceeweb.Elpas. org and sign in to your MicksGarage account. Enter A624 in the SulfurCell box to learn more about \"Foot Pain: Care Instructions. \"     If you do not have an account, please click on the \"Sign Up Now\" link. Current as of: September 20, 2018  Content Version: 11.9  © 3977-6114 [employer]. Care instructions adapted under license by [facility] ([facility]). If you have questions about a medical condition or this instruction, always ask your healthcare professional. [address] any warranty or liability for your use of this information. Patient Education        Rosacea: Care Instructions  Your Care Instructions  Rosacea (say \"[id number]\") is a skin condition that can cause redness, pimples, and red lines on the nose, cheeks, chin, and forehead. It is often mistaken for acne because it can cause outbreaks with bumps like pimples. Rosacea can also cause burning and soreness in your eyes. Rosacea is usually controlled by using medicine and avoiding alcohol, the sun, and other things that can make rosacea worse. Your doctor may have prescribed medicines or other treatment. If antibiotics do not control the rosacea, your doctor may try other medicines. Follow-up care is a key part of your treatment and safety.  Be sure to make and go to and saunas. When should you call for help? Watch closely for changes in your health, and be sure to contact your doctor if:    · You do not get better as expected. Where can you learn more? Go to https://chpejose albertoeb.VeedMe. org and sign in to your TodoCast TV account. Enter G745 in the GameDuell box to learn more about \"Rosacea: Care Instructions. \"     If you do not have an account, please click on the \"Sign Up Now\" link. Current as of: April 17, 2018  Content Version: 11.9  © 0347-8068 [employer]. Care instructions adapted under license by [facility] ([facility]). If you have questions about a medical condition or this instruction, always ask your healthcare professional. [address] any warranty or liability for your use of this information.

## 2019-04-13 LAB — VITAMIN B-12: 579 PG/ML (ref 232–1245)

## 2019-04-15 DIAGNOSIS — M79.671 BILATERAL FOOT PAIN: Primary | ICD-10-CM

## 2019-04-15 DIAGNOSIS — M79.672 BILATERAL FOOT PAIN: Primary | ICD-10-CM

## 2019-04-16 ENCOUNTER — OFFICE VISIT (OUTPATIENT)
Dept: PODIATRY | Age: 69
End: 2019-04-16
Payer: MEDICARE

## 2019-04-16 VITALS
HEART RATE: 88 BPM | HEIGHT: 70 IN | SYSTOLIC BLOOD PRESSURE: 132 MMHG | DIASTOLIC BLOOD PRESSURE: 78 MMHG | WEIGHT: 176 LBS | BODY MASS INDEX: 25.2 KG/M2

## 2019-04-16 DIAGNOSIS — M79.671 FOOT PAIN, BILATERAL: ICD-10-CM

## 2019-04-16 DIAGNOSIS — M79.672 FOOT PAIN, BILATERAL: ICD-10-CM

## 2019-04-16 DIAGNOSIS — M77.8 CAPSULITIS OF FOOT, UNSPECIFIED LATERALITY: Primary | ICD-10-CM

## 2019-04-16 PROCEDURE — 1123F ACP DISCUSS/DSCN MKR DOCD: CPT | Performed by: PODIATRIST

## 2019-04-16 PROCEDURE — 3017F COLORECTAL CA SCREEN DOC REV: CPT | Performed by: PODIATRIST

## 2019-04-16 PROCEDURE — 4004F PT TOBACCO SCREEN RCVD TLK: CPT | Performed by: PODIATRIST

## 2019-04-16 PROCEDURE — 99203 OFFICE O/P NEW LOW 30 MIN: CPT | Performed by: PODIATRIST

## 2019-04-16 PROCEDURE — G8427 DOCREV CUR MEDS BY ELIG CLIN: HCPCS | Performed by: PODIATRIST

## 2019-04-16 PROCEDURE — G8419 CALC BMI OUT NRM PARAM NOF/U: HCPCS | Performed by: PODIATRIST

## 2019-04-16 PROCEDURE — 4040F PNEUMOC VAC/ADMIN/RCVD: CPT | Performed by: PODIATRIST

## 2019-04-16 RX ORDER — METHYLPREDNISOLONE 4 MG/1
TABLET ORAL
Qty: 1 KIT | Refills: 0 | Status: SHIPPED | OUTPATIENT
Start: 2019-04-16 | End: 2019-09-16 | Stop reason: ALTCHOICE

## 2019-04-16 NOTE — PROGRESS NOTES
Subjective:  David Fitch is a 77 y.o. male who presents to the office today complaining of pain in the ball of the Bilateral foot. Symptoms began 4 month(s) ago. More pain when he is on his feet. Patient relates pain is Present. Pain is rated 4 out of 10 and is described as intermittent. Recently seems to be doing better. Treatments prior to today's visit include: none. Currently denies F/C/N/V. Allergies   Allergen Reactions    Diclofenac Sodium        Past Medical History:   Diagnosis Date    COPD (ch[facility] obstructive [facility] disease) ([facility] [address]     Fracture of ulnar styloid     Headache(784.0)     Tesion    Hyperlipidemia     Hypertension     MI (myocardial infarction) ([facility] [address]     Neck pain, chronic     Posttraumatic stress disorder     with panic disorder and anxiety    Tremor        Prior to Admission medications    Medication Sig Start Date End Date Taking? Authorizing Provider   methylPREDNISolone (MEDROL DOSEPACK) 4 MG tablet Take by mouth. 4/16/19  Yes Marlen Staley DPM   metroNIDAZOLE (METROGEL) 0.75 % gel Apply topically 2 times daily. 4/12/19  Yes Braxton Lesches, APRN - CNP   buPROPion [facility] SR) 150 MG extended release tablet Take 1 PO daily for 3 days then increase to 1 PO BID. 4/12/19  Yes Braxton Lesches, APRN - CNP   theophylline (THEODUR) 300 MG extended release tablet Take 1 tablet by mouth 2 times daily   Yes Historical Provider, MD   Budesonide-Formoterol Fumarate (SYMBICORT IN) Inhale 2 puffs into the lungs daily   Yes Historical Provider, MD   ALPRAZolam Mandeep Suazo) 0.25 MG tablet Take 0.25 mg by mouth nightly as needed for Sleep. .   Yes Historical Provider, MD   albuterol sulfate HFA (PROAIR HFA) 108 (90 Base) MCG/ACT inhaler Inhale 2 puffs into the lungs every 6 hours as needed for Wheezing or Shortness of Breath 8/31/18  Yes Braxton Lesches, APRN - CNP   Cyanocobalamin (VITAMIN B-12) 1000 MCG extended release tablet Take 1,500 mcg by mouth daily   Yes Historical bilateral.  Varicosities absent, bilateral. Erythema absent, bilateral. Distal Rubor absent bilateral.  Temperature within normal limits bilateral. Hyperpigmentation absent bilateral. No atrophic skin. Neurological: Sensation intact to light touch to level of digits, bilateral.  Protective sensation intact 10/10 sites via 5.07/10g Colfax-Nathaniel Monofilament, bilateral.  negative Tinel's, bilateral.  negative Valleix sign, bilateral.  Vibratory intact bilateral.  Reflexes Decreased bilateral.  Paresthesias negative. Dysthesias negative. Sharp/dull intact bilateral.  Musculoskeletal: Muscle strength 5/5, Bilateral.  Pain present upon palpation of 4 metatarsal heads, Bilateral.  negative lachman test.  within normal limits medial longitudinal arch, Bilateral.  Hypermobility at 1st metatarsal-cuneiform joint is negative. Ankle ROM decreased,Bilateral.  1st MPJ ROM within normal limits, Bilateral.  Dorsally contracted digits present digits , Bilateral. Other foot deformities none. Integument: Warm, dry, supple, bilateral.  Open lesion absent, bilateral.  Interdigital maceration absent to web spaces bilateral.  Nails within normal limits. Fissures absent, bilateral. Hyperkeratotic tissue is absent. Asessment: Patient is a 77 y.o. male with:    Diagnosis Orders   1. Capsulitis of foot, unspecified laterality     2. Foot pain, bilateral         Plan: Patient examined and evaluated. Current condition and treatment options discussed in detail. Appropriate offloading discussed. Prefabricated insoles with offloading pads advised at all times WB. Advised pt to use OTC anti inflammatory on a regular basis. Orders Placed This Encounter   Medications    methylPREDNISolone (MEDROL DOSEPACK) 4 MG tablet     Sig: Take by mouth. Dispense:  1 kit     Refill:  0     Pt was given the option of pre fabricated insoles. Pt was advised on appropriate use and how they can help their condition.   Pt should use these

## 2019-05-09 ENCOUNTER — OFFICE VISIT (OUTPATIENT)
Dept: CARDIOLOGY CLINIC | Age: 69
End: 2019-05-09
Payer: MEDICARE

## 2019-05-09 VITALS
HEIGHT: 70 IN | DIASTOLIC BLOOD PRESSURE: 72 MMHG | SYSTOLIC BLOOD PRESSURE: 124 MMHG | HEART RATE: 99 BPM | WEIGHT: 171.8 LBS | BODY MASS INDEX: 24.6 KG/M2

## 2019-05-09 DIAGNOSIS — J44.9 CHRONIC OBSTRUCTIVE PULMONARY DISEASE, UNSPECIFIED COPD TYPE (HCC): Primary | ICD-10-CM

## 2019-05-09 DIAGNOSIS — E78.5 HYPERLIPIDEMIA, UNSPECIFIED HYPERLIPIDEMIA TYPE: ICD-10-CM

## 2019-05-09 DIAGNOSIS — I10 ESSENTIAL HYPERTENSION: ICD-10-CM

## 2019-05-09 DIAGNOSIS — I45.10 RBBB: ICD-10-CM

## 2019-05-09 DIAGNOSIS — Z72.0 TOBACCO ABUSE: ICD-10-CM

## 2019-05-09 PROCEDURE — 3017F COLORECTAL CA SCREEN DOC REV: CPT | Performed by: INTERNAL MEDICINE

## 2019-05-09 PROCEDURE — 1123F ACP DISCUSS/DSCN MKR DOCD: CPT | Performed by: INTERNAL MEDICINE

## 2019-05-09 PROCEDURE — 99214 OFFICE O/P EST MOD 30 MIN: CPT | Performed by: INTERNAL MEDICINE

## 2019-05-09 PROCEDURE — 4004F PT TOBACCO SCREEN RCVD TLK: CPT | Performed by: INTERNAL MEDICINE

## 2019-05-09 PROCEDURE — 4040F PNEUMOC VAC/ADMIN/RCVD: CPT | Performed by: INTERNAL MEDICINE

## 2019-05-09 PROCEDURE — 3023F SPIROM DOC REV: CPT | Performed by: INTERNAL MEDICINE

## 2019-05-09 PROCEDURE — G8926 SPIRO NO PERF OR DOC: HCPCS | Performed by: INTERNAL MEDICINE

## 2019-05-09 PROCEDURE — G8428 CUR MEDS NOT DOCUMENT: HCPCS | Performed by: INTERNAL MEDICINE

## 2019-05-09 PROCEDURE — G8420 CALC BMI NORM PARAMETERS: HCPCS | Performed by: INTERNAL MEDICINE

## 2019-05-09 NOTE — PROGRESS NOTES
Today's Date: 5/9/2019  Patient Name: Maria C Alcala. Patient's age: 77 y.o., 1950      HPI and CC:   The patient is a 77 y.o.  male is in the office for f/u after a negative stress test on 9/12/2018. He is scheduled to see pulmonary later. No chest pain. He has claudication. Past Medical History:   has a past medical history of COPD (chronic obstructive pulmonary disease) ([address] Fracture of ulnar styloid, Headache(784.0), Hyperlipidemia, Hypertension, MI (myocardial infarction) ([address] Neck pain, chronic, Posttraumatic stress disorder, and Tremor. Past Surgical History:   has a past surgical history that includes Appendectomy; Vasectomy; Tonsillectomy; Elbow surgery (Left, 1999); Cardiac catheterization (7/10); and Colonoscopy (2015). Home Medications:    Prior to Admission medications    Medication Sig Start Date End Date Taking? Authorizing Provider   methylPREDNISolone (MEDROL DOSEPACK) 4 MG tablet Take by mouth. 4/16/19  Yes Jayjay Staley DPM   metroNIDAZOLE (METROGEL) 0.75 % gel Apply topically 2 times daily. 4/12/19  Yes AIMEE Villalobos CNP   buPROPion [facility]) 150 MG extended release tablet Take 1 PO daily for 3 days then increase to 1 PO BID. 4/12/19  Yes AIMEE Villalobos CNP   theophylline (THEODUR) 300 MG extended release tablet Take 1 tablet by mouth 2 times daily   Yes Historical Provider, MD   Budesonide-Formoterol Fumarate (SYMBICORT IN) Inhale 2 puffs into the lungs daily   Yes Historical Provider, MD   ALPRAZolam Reina Elver) 0.25 MG tablet Take 0.25 mg by mouth nightly as needed for Sleep. .   Yes Historical Provider, MD   albuterol sulfate HFA (PROAIR HFA) 108 (90 Base) MCG/ACT inhaler Inhale 2 puffs into the lungs every 6 hours as needed for Wheezing or Shortness of Breath 8/31/18  Yes AIMEE Villalobos CNP   Cyanocobalamin (VITAMIN B-12) 1000 MCG extended release tablet Take 1,500 mcg by mouth daily   Yes Historical Provider, MD   Multiple Vitamins-Minerals (MULTIVITAMIN & MINERAL PO) Take 1 tablet by mouth Indications: When he remembers   Yes Historical Provider, MD   sildenafil (VIAGRA) 50 MG tablet Take 1 tablet by mouth as needed for Erectile Dysfunction 12/2/16  Yes AIMEE Vivas CNP   MELOXICAM PO Take 7.5 mg by mouth daily    Yes Historical Provider, MD   hypromellose 0.4 % SOLN ophthalmic solution Place 1 drop into both eyes 3 times daily   Yes Historical Provider, MD   verapamil (CALAN-SR) 180 MG CR tablet Take 180 mg by mouth daily. Yes Historical Provider, MD   lovastatin (MEVACOR) 10 MG tablet Take 10 mg by mouth daily. Yes Historical Provider, MD   aspirin 81 MG tablet Take 81 mg by mouth daily. Yes Historical Provider, MD       Allergies:  Diclofenac sodium    Social History:   reports that he has been smoking cigarettes. He has a 20.00 pack-year smoking history. He has never used smokeless tobacco. He reports that he drinks about 7.0 oz of alcohol per week. REVIEW OF SYSTEMS:  CONSTITUTIONAL:NEGATIVE  HEENT:NEG  Cardiovascular: No chest pain, Yes dyspnea on exertion, No palpitations. Lower extremity edema: No  RESPIRATORY: LEDESMA  GASTROINTESTINAL:  positive for reflux  GENITOURINARY:  negative  INTEGUMENT:  negative  MUSCULOSKELETAL:  positive for  pain  NEUROLOGICAL:  negative    PHYSICAL EXAM:      /72 (Site: Left Upper Arm, Position: Sitting, Cuff Size: Large Adult)   Pulse 99   Ht 5' 10\" (1.778 m)   Wt 171 lb 12.8 oz (77.9 kg)   BMI 24.65 kg/m² HEENT: PERRL, no cervical lymphadenopathy. No masses palpable. Cardiovascular:  · The apical impulse is not displaced  · Heart  Sounds:RRR, S4  · Jugular venous pulsation Normal  · The carotid upstroke is NL  · Peripheral pulses are symmetrical and full  Respiratory: Good respiratory effort.  On auscultation: clear to auscultation bilaterally  Abdomen:  · No masses or tenderness  · Bowel sounds present  Extremities:  ·  No Cyanosis or Clubbing  ·  Lower extremity edema: No  Skin:   · Warm and dry    Cardiac data:      Labs:   CBC: No results for input(s): WBC, HGB, HCT, PLT in the last 72 hours. BMP: No results for input(s): NA, K, CO2, BUN, CREATININE, LABGLOM, GLUCOSE in the last 72 hours. PT/INR: No results for input(s): PROTIME, INR in the last 72 hours. FASTING LIPID PANEL:No results found for: HDL, LDLDIRECT, LDLCALC, TRIG  LIVER PROFILE:No results for input(s): AST, ALT, LABALBU in the last 72 hours. Patient Active Problem List   Diagnosis    Abnormal ECG    RBBB    Other chest pain    LEDESMA (dyspnea on exertion)    Chronic obstructive pulmonary disease ([address]     Echo 11/5/18   The Ejection Fraction is 60-65%.     Grade I diastolic dysfunction.     The right ventricle structure and function is normal.     Mitral regurgitation is trace.     There is mild tricuspid regurgitation.     The aortic valve is mildly sclerotic.     There is mild aortic root dilatation.     There is no pericardial effusion.      NINO 11/13/18  Normal    Stress 9/12/18  Normal perfusion  EF 69%    PFTs: 9/12/18  Mild obstructive disease and Asthmatic response      IMPRESSION & RECOMMENDATIONS:    1. LEDESMA- has mild asthma/copd. On meds and inhales now. Feels better. Seeing Dr. Jake Tavares for this. 2. NEGATIVE STRESS TEST 9/20108  3. COPD- stable on meds  4. RBBB- follow with ECG  5. HTN- stable on meds  6. Obesity - stable, diet and exercise  7. Tobacco abuse- counselled  8. Hyperlipidemia- on meds, stable  9. Claudication- neg NINO      Thank you for allowing me to participate in the care of this patient, please do not hesitate to call if you have any questions. Joaquin Hernandez, [id number] [facility]  [facility]. [email or web address]  [phone]

## 2019-09-16 ENCOUNTER — OFFICE VISIT (OUTPATIENT)
Dept: FAMILY MEDICINE CLINIC | Age: 69
End: 2019-09-16
Payer: MEDICARE

## 2019-09-16 VITALS
DIASTOLIC BLOOD PRESSURE: 66 MMHG | OXYGEN SATURATION: 96 % | BODY MASS INDEX: 24.65 KG/M2 | HEART RATE: 85 BPM | SYSTOLIC BLOOD PRESSURE: 114 MMHG | WEIGHT: 171.8 LBS

## 2019-09-16 DIAGNOSIS — J44.9 CHRONIC OBSTRUCTIVE PULMONARY DISEASE, UNSPECIFIED COPD TYPE (HCC): Primary | ICD-10-CM

## 2019-09-16 DIAGNOSIS — Z12.2 ENCOUNTER FOR SCREENING FOR LUNG CANCER: ICD-10-CM

## 2019-09-16 DIAGNOSIS — Z23 NEED FOR PNEUMOCOCCAL VACCINATION: ICD-10-CM

## 2019-09-16 DIAGNOSIS — F32.A DEPRESSION, UNSPECIFIED DEPRESSION TYPE: ICD-10-CM

## 2019-09-16 DIAGNOSIS — L71.9 ROSACEA, UNSPECIFIED: ICD-10-CM

## 2019-09-16 PROCEDURE — G0009 ADMIN PNEUMOCOCCAL VACCINE: HCPCS | Performed by: NURSE PRACTITIONER

## 2019-09-16 PROCEDURE — G8420 CALC BMI NORM PARAMETERS: HCPCS | Performed by: NURSE PRACTITIONER

## 2019-09-16 PROCEDURE — G8427 DOCREV CUR MEDS BY ELIG CLIN: HCPCS | Performed by: NURSE PRACTITIONER

## 2019-09-16 PROCEDURE — 1036F TOBACCO NON-USER: CPT | Performed by: NURSE PRACTITIONER

## 2019-09-16 PROCEDURE — 90732 PPSV23 VACC 2 YRS+ SUBQ/IM: CPT | Performed by: NURSE PRACTITIONER

## 2019-09-16 PROCEDURE — G8926 SPIRO NO PERF OR DOC: HCPCS | Performed by: NURSE PRACTITIONER

## 2019-09-16 PROCEDURE — 1123F ACP DISCUSS/DSCN MKR DOCD: CPT | Performed by: NURSE PRACTITIONER

## 2019-09-16 PROCEDURE — 3023F SPIROM DOC REV: CPT | Performed by: NURSE PRACTITIONER

## 2019-09-16 PROCEDURE — 3017F COLORECTAL CA SCREEN DOC REV: CPT | Performed by: NURSE PRACTITIONER

## 2019-09-16 PROCEDURE — 99213 OFFICE O/P EST LOW 20 MIN: CPT | Performed by: NURSE PRACTITIONER

## 2019-09-16 PROCEDURE — 4040F PNEUMOC VAC/ADMIN/RCVD: CPT | Performed by: NURSE PRACTITIONER

## 2019-09-16 ASSESSMENT — ENCOUNTER SYMPTOMS
CHOKING: 0
SHORTNESS OF BREATH: 1
CHEST TIGHTNESS: 0
ABDOMINAL PAIN: 0
COUGH: 1

## 2019-09-16 ASSESSMENT — PATIENT HEALTH QUESTIONNAIRE - PHQ9
1. LITTLE INTEREST OR PLEASURE IN DOING THINGS: 0
2. FEELING DOWN, DEPRESSED OR HOPELESS: 0
SUM OF ALL RESPONSES TO PHQ QUESTIONS 1-9: 0
SUM OF ALL RESPONSES TO PHQ9 QUESTIONS 1 & 2: 0
SUM OF ALL RESPONSES TO PHQ QUESTIONS 1-9: 0

## 2019-09-16 NOTE — PROGRESS NOTES
yearly screening    PNEUMOVAX 23 subcutaneous/IM (Pneumococcal polysaccharide vaccine 23-valent >= 1yo)       Get records from [place] regarding blood work and 2015 colonoscopy. Patient given educational materials - see patient instructions. Discussed use, benefit, and side effects of prescribed medications. All patient questions answered. Pt voiced understanding. Reviewed health maintenance. Instructed to continue current medications, diet and exercise.       Electronically signed by AIMEE Fernando CNP on 9/16/2019 at 2:30 PM

## 2019-09-20 ENCOUNTER — HOSPITAL ENCOUNTER (OUTPATIENT)
Dept: CT IMAGING | Age: 69
Discharge: HOME | End: 2019-09-22
Payer: MEDICARE

## 2019-09-20 DIAGNOSIS — Z12.2 ENCOUNTER FOR SCREENING FOR LUNG CANCER: ICD-10-CM

## 2019-09-20 PROCEDURE — G0297 LDCT FOR LUNG CA SCREEN: HCPCS

## 2019-09-20 PROCEDURE — 71250 CT THORAX DX C-: CPT

## 2019-11-19 DIAGNOSIS — F32.A DEPRESSION, UNSPECIFIED DEPRESSION TYPE: ICD-10-CM

## 2019-11-19 RX ORDER — BUPROPION HYDROCHLORIDE 150 MG/1
TABLET, EXTENDED RELEASE ORAL
Qty: 60 TABLET | Refills: 3 | Status: SHIPPED | OUTPATIENT
Start: 2019-11-19 | End: 2020-03-30 | Stop reason: ALTCHOICE

## 2020-05-26 ENCOUNTER — OFFICE VISIT (OUTPATIENT)
Dept: PRIMARY CARE CLINIC | Age: 70
End: 2020-05-26
Payer: MEDICARE

## 2020-05-26 VITALS
WEIGHT: 188 LBS | OXYGEN SATURATION: 95 % | BODY MASS INDEX: 26.92 KG/M2 | SYSTOLIC BLOOD PRESSURE: 130 MMHG | HEART RATE: 75 BPM | HEIGHT: 70 IN | DIASTOLIC BLOOD PRESSURE: 72 MMHG

## 2020-05-26 PROCEDURE — 99212 OFFICE O/P EST SF 10 MIN: CPT

## 2020-05-26 PROCEDURE — 4040F PNEUMOC VAC/ADMIN/RCVD: CPT | Performed by: FAMILY MEDICINE

## 2020-05-26 PROCEDURE — G8427 DOCREV CUR MEDS BY ELIG CLIN: HCPCS | Performed by: FAMILY MEDICINE

## 2020-05-26 PROCEDURE — G8417 CALC BMI ABV UP PARAM F/U: HCPCS | Performed by: FAMILY MEDICINE

## 2020-05-26 PROCEDURE — 1123F ACP DISCUSS/DSCN MKR DOCD: CPT | Performed by: FAMILY MEDICINE

## 2020-05-26 PROCEDURE — 3017F COLORECTAL CA SCREEN DOC REV: CPT | Performed by: FAMILY MEDICINE

## 2020-05-26 PROCEDURE — 99213 OFFICE O/P EST LOW 20 MIN: CPT | Performed by: FAMILY MEDICINE

## 2020-05-26 PROCEDURE — 99212 OFFICE O/P EST SF 10 MIN: CPT | Performed by: FAMILY MEDICINE

## 2020-05-26 PROCEDURE — 1036F TOBACCO NON-USER: CPT | Performed by: FAMILY MEDICINE

## 2020-05-26 RX ORDER — CEPHALEXIN 500 MG/1
500 CAPSULE ORAL 3 TIMES DAILY
Qty: 21 CAPSULE | Refills: 0 | Status: SHIPPED | OUTPATIENT
Start: 2020-05-26 | End: 2020-06-03 | Stop reason: SDUPTHER

## 2020-05-26 RX ORDER — OMEPRAZOLE 20 MG/1
20 CAPSULE, DELAYED RELEASE ORAL DAILY
COMMUNITY

## 2020-05-26 RX ORDER — ROSUVASTATIN CALCIUM 40 MG/1
60 TABLET, COATED ORAL EVERY EVENING
Status: ON HOLD | COMMUNITY
End: 2020-06-24 | Stop reason: HOSPADM

## 2020-05-26 NOTE — PROGRESS NOTES
[address]  [address], [address] [place]  [phone]      Jose Finley is a 68 y.o. male who is c/o of Hand Pain (x 4 days started swelling , he has a thorn in palm and he popped it and now his whole had his swelling)      HPI:     Hand Pain    The pain is mild. Treatments tried: black salve. Had one thorn get lodged in the palm of his R hand a couple years ago; started swelling up last week. Subjective:      Past Medical History:   Diagnosis Date    [facility]ch[facility] obstructive [facility] disease) [facility] [address]     Fracture of ulnar styloid     Headache(784.0)     Tesion    Hyperlipidemia     Hypertension     MI (myocardial infarction) ([facility] [address]     Neck pain, chronic     Posttraumatic stress disorder     with panic disorder and anxiety    Tremor       Past Surgical History:   Procedure Laterality Date    APPENDECTOMY      CARDIAC CATHETERIZATION  7/10    COLONOSCOPY  2015    ELBOW SURGERY Left 1999    TONSILLECTOMY      VASECTOMY         Social History     Tobacco Use    Smoking status: Former Smoker     Packs/day: 0.50     Years: 40.00     Pack years: 20.00     Types: Cigarettes     Last attempt to quit: 10/11/2018     Years since quittin.6    Smokeless tobacco: Never Used    Tobacco comment: uses nicorette   Substance Use Topics    Alcohol use: Yes     Alcohol/week: 11.7 standard drinks     Types: 14 Standard drinks or equivalent per week      Current Outpatient Medications   Medication Sig Dispense Refill    rosuvastatin (CRESTOR) 40 MG tablet Take 60 mg by mouth every evening Takes 1.5 tabs daily      omeprazole (PRILOSEC) 20 MG delayed release capsule Take 20 mg by mouth daily      gabapentin (NEURONTIN) 600 MG tablet Take 1 tablet by mouth 3 times daily.       warfarin (COUMADIN) 5 MG tablet Take 5 mg by mouth Take1 full tablet MWF and 0.5 tablets on the other days      vitamin C (ASCORBIC ACID) 500 MG tablet Take 500 mg by normal.      Nose: Nose normal.      Mouth/Throat:      Mouth: Mucous membranes are moist.      Pharynx: Oropharynx is clear. No oropharyngeal exudate. Eyes:      Conjunctiva/sclera: Conjunctivae normal.   Cardiovascular:      Rate and Rhythm: Normal rate and regular rhythm. Heart sounds: Normal heart sounds. Pulmonary:      Effort: Pulmonary effort is normal. No respiratory distress. Breath sounds: Normal breath sounds. Abdominal:      General: Bowel sounds are normal. There is no distension. Palpations: Abdomen is soft. Tenderness: There is no abdominal tenderness. Skin:     General: Skin is warm and dry. Neurological:      Mental Status: He is alert and oriented to person, place, and time. Assessment:       Diagnosis Orders   1. Infected cyst of skin  DISCONTINUED: cephALEXin (KEFLEX) 500 MG capsule   2. Retained foreign body of hand  XR HAND RIGHT (MIN 3 VIEWS)       Plan:      Return if symptoms worsen or fail to improve. Orders Placed This Encounter   Procedures    XR HAND RIGHT (MIN 3 VIEWS)     Standing Status:   Future     Standing Expiration Date:   5/26/2021     Order Specific Question:   Reason for exam:     Answer:   retained thorn/thistle in pad of R palm x 1-2 years; swelling and pain at site that is new     Orders Placed This Encounter   Medications    DISCONTD: cephALEXin (KEFLEX) 500 MG capsule     Sig: Take 1 capsule by mouth 3 times daily for 7 days     Dispense:  21 capsule     Refill:  0       Patient given educational materials - see patient instructions. Discussed use, benefit, and side effects of prescribed medications. All patient questions answered. Pt voiced understanding.        Electronically signed by Vikas Gonzales DO on 6/7/2020 at 11:47 PM

## 2020-06-02 ENCOUNTER — TELEPHONE (OUTPATIENT)
Dept: PRIMARY CARE CLINIC | Age: 70
End: 2020-06-02

## 2020-06-03 RX ORDER — CEPHALEXIN 500 MG/1
500 CAPSULE ORAL 3 TIMES DAILY
Qty: 15 CAPSULE | Refills: 0 | Status: SHIPPED | OUTPATIENT
Start: 2020-06-03 | End: 2020-06-08

## 2020-06-03 NOTE — TELEPHONE ENCOUNTER
Per pt, swelling has not decreased more than 1/2. Pt is c/o tenderness, but no warmth or redness. Last INR at 2.7 on 6/1/2020.

## 2020-06-03 NOTE — TELEPHONE ENCOUNTER
Will send in another Rx for Keflex for 5 more days; if still not much improved, may need to try another antibiotic or have him seen again. I'm trying to avoid other antibiotics that may increase his INR, but if this is not resolving his issue, will need to know. Referral placed for Dr. Cinda Bailey as well.

## 2020-06-04 ENCOUNTER — INITIAL CONSULT (OUTPATIENT)
Dept: SURGERY | Age: 70
End: 2020-06-04
Payer: MEDICARE

## 2020-06-04 ENCOUNTER — HOSPITAL ENCOUNTER (OUTPATIENT)
Age: 70
Setting detail: SPECIMEN
Discharge: HOME | End: 2020-06-04
Payer: MEDICARE

## 2020-06-04 VITALS
HEART RATE: 68 BPM | BODY MASS INDEX: 26.92 KG/M2 | SYSTOLIC BLOOD PRESSURE: 138 MMHG | WEIGHT: 188 LBS | HEIGHT: 70 IN | DIASTOLIC BLOOD PRESSURE: 80 MMHG | RESPIRATION RATE: 16 BRPM | TEMPERATURE: 97.8 F

## 2020-06-04 PROCEDURE — 88304 TISSUE EXAM BY PATHOLOGIST: CPT

## 2020-06-04 PROCEDURE — 26160 REMOVE TENDON SHEATH LESION: CPT | Performed by: SURGERY

## 2020-06-04 PROCEDURE — 99214 OFFICE O/P EST MOD 30 MIN: CPT

## 2020-06-04 PROCEDURE — 99024 POSTOP FOLLOW-UP VISIT: CPT | Performed by: SURGERY

## 2020-06-04 NOTE — PATIENT INSTRUCTIONS
\"Sign Up Now\" link. Current as of: June 26, 2019               Content Version: 12.5  © 9006-6008 Healthwise, Incorporated. Care instructions adapted under license by [facility] ([facility]). If you have questions about a medical condition or this instruction, always ask your healthcare professional. [address] any warranty or liability for your use of this information.

## 2020-06-04 NOTE — PROGRESS NOTES
Thinking this was a foreign body based upon his history I prepped it and injected some local anesthetic in it. Small incision was made over the brown area and then a large amount of serous fluid came out. I encountered a well developed cyst wall and some evidence of old blood. At this point I am pretty sure this is a cystic structure most likely representing a synovial cyst.  Sincerity had an incision and was into the cyst I did go ahead and try to remove the whole thing, even though this is normally something I did send to a hand surgeon. I believe I got the majority of the cyst wall out and that was submitted for pathology. Put a single 4-0 nylon suture and closed the skin. I will have him follow-up in a week for a wound check. If he has increasing pain or redness though should give us a call.

## 2020-06-07 ASSESSMENT — ENCOUNTER SYMPTOMS: COLOR CHANGE: 1

## 2020-06-08 LAB — SURGICAL PATHOLOGY REPORT: NORMAL

## 2020-06-12 ENCOUNTER — OFFICE VISIT (OUTPATIENT)
Dept: SURGERY | Age: 70
End: 2020-06-12
Payer: MEDICARE

## 2020-06-12 VITALS
OXYGEN SATURATION: 99 % | WEIGHT: 189 LBS | SYSTOLIC BLOOD PRESSURE: 134 MMHG | HEART RATE: 73 BPM | RESPIRATION RATE: 16 BRPM | TEMPERATURE: 97.7 F | BODY MASS INDEX: 27.06 KG/M2 | DIASTOLIC BLOOD PRESSURE: 80 MMHG | HEIGHT: 70 IN

## 2020-06-12 PROCEDURE — 99214 OFFICE O/P EST MOD 30 MIN: CPT

## 2020-06-12 PROCEDURE — 99024 POSTOP FOLLOW-UP VISIT: CPT | Performed by: SURGERY

## 2020-06-12 NOTE — PROGRESS NOTES
Bobbi Dominguez is here today for a post-operative wound check. The wound appears clean, dry, intact and nontender. Sutures had already been removed.    Steri-strips were not applied   Pathology Epidermal Cyst   Follow-up prn

## 2020-06-16 ENCOUNTER — HOSPITAL ENCOUNTER (OUTPATIENT)
Dept: GENERAL RADIOLOGY | Age: 70
Discharge: HOME | End: 2020-06-18
Payer: MEDICARE

## 2020-06-16 ENCOUNTER — OFFICE VISIT (OUTPATIENT)
Dept: PRIMARY CARE CLINIC | Age: 70
End: 2020-06-16
Payer: MEDICARE

## 2020-06-16 VITALS
WEIGHT: 189 LBS | TEMPERATURE: 97 F | RESPIRATION RATE: 16 BRPM | OXYGEN SATURATION: 96 % | DIASTOLIC BLOOD PRESSURE: 84 MMHG | HEIGHT: 70 IN | BODY MASS INDEX: 27.06 KG/M2 | HEART RATE: 76 BPM | SYSTOLIC BLOOD PRESSURE: 144 MMHG

## 2020-06-16 PROCEDURE — 90715 TDAP VACCINE 7 YRS/> IM: CPT | Performed by: PHYSICIAN ASSISTANT

## 2020-06-16 PROCEDURE — 4040F PNEUMOC VAC/ADMIN/RCVD: CPT | Performed by: PHYSICIAN ASSISTANT

## 2020-06-16 PROCEDURE — 1036F TOBACCO NON-USER: CPT | Performed by: PHYSICIAN ASSISTANT

## 2020-06-16 PROCEDURE — G8427 DOCREV CUR MEDS BY ELIG CLIN: HCPCS | Performed by: PHYSICIAN ASSISTANT

## 2020-06-16 PROCEDURE — 73590 X-RAY EXAM OF LOWER LEG: CPT

## 2020-06-16 PROCEDURE — 99214 OFFICE O/P EST MOD 30 MIN: CPT | Performed by: PHYSICIAN ASSISTANT

## 2020-06-16 PROCEDURE — 1123F ACP DISCUSS/DSCN MKR DOCD: CPT | Performed by: PHYSICIAN ASSISTANT

## 2020-06-16 PROCEDURE — 99212 OFFICE O/P EST SF 10 MIN: CPT

## 2020-06-16 PROCEDURE — G8417 CALC BMI ABV UP PARAM F/U: HCPCS | Performed by: PHYSICIAN ASSISTANT

## 2020-06-16 PROCEDURE — 3017F COLORECTAL CA SCREEN DOC REV: CPT | Performed by: PHYSICIAN ASSISTANT

## 2020-06-16 RX ORDER — HYDROCODONE BITARTRATE AND ACETAMINOPHEN 5; 325 MG/1; MG/1
1 TABLET ORAL EVERY 6 HOURS PRN
Qty: 12 TABLET | Refills: 0 | Status: SHIPPED | OUTPATIENT
Start: 2020-06-16 | End: 2020-06-19

## 2020-06-16 ASSESSMENT — PATIENT HEALTH QUESTIONNAIRE - PHQ9
SUM OF ALL RESPONSES TO PHQ QUESTIONS 1-9: 0
1. LITTLE INTEREST OR PLEASURE IN DOING THINGS: 0
SUM OF ALL RESPONSES TO PHQ9 QUESTIONS 1 & 2: 0
2. FEELING DOWN, DEPRESSED OR HOPELESS: 0
SUM OF ALL RESPONSES TO PHQ QUESTIONS 1-9: 0

## 2020-06-16 ASSESSMENT — ENCOUNTER SYMPTOMS: RESPIRATORY NEGATIVE: 1

## 2020-06-18 ENCOUNTER — HOSPITAL ENCOUNTER (EMERGENCY)
Age: 70
Discharge: HOME | DRG: 603 | End: 2020-06-18
Attending: EMERGENCY MEDICINE
Payer: MEDICARE

## 2020-06-18 VITALS
SYSTOLIC BLOOD PRESSURE: 158 MMHG | OXYGEN SATURATION: 92 % | HEART RATE: 120 BPM | TEMPERATURE: 97.9 F | BODY MASS INDEX: 27.06 KG/M2 | WEIGHT: 189 LBS | HEIGHT: 70 IN | RESPIRATION RATE: 17 BRPM | DIASTOLIC BLOOD PRESSURE: 92 MMHG

## 2020-06-18 PROCEDURE — 99282 EMERGENCY DEPT VISIT SF MDM: CPT

## 2020-06-18 PROCEDURE — 6370000000 HC RX 637 (ALT 250 FOR IP): Performed by: EMERGENCY MEDICINE

## 2020-06-18 RX ORDER — SULFAMETHOXAZOLE AND TRIMETHOPRIM 800; 160 MG/1; MG/1
1 TABLET ORAL ONCE
Status: COMPLETED | OUTPATIENT
Start: 2020-06-18 | End: 2020-06-18

## 2020-06-18 RX ORDER — SULFAMETHOXAZOLE AND TRIMETHOPRIM 800; 160 MG/1; MG/1
1 TABLET ORAL 2 TIMES DAILY
Qty: 14 TABLET | Refills: 0 | Status: SHIPPED | OUTPATIENT
Start: 2020-06-18 | End: 2020-06-25

## 2020-06-18 RX ORDER — CEPHALEXIN 500 MG/1
500 CAPSULE ORAL 4 TIMES DAILY
Qty: 28 CAPSULE | Refills: 0 | Status: ON HOLD | OUTPATIENT
Start: 2020-06-18 | End: 2020-06-24 | Stop reason: HOSPADM

## 2020-06-18 RX ORDER — CEPHALEXIN 250 MG/1
500 CAPSULE ORAL ONCE
Status: COMPLETED | OUTPATIENT
Start: 2020-06-18 | End: 2020-06-18

## 2020-06-18 RX ADMIN — CEPHALEXIN 500 MG: 250 CAPSULE ORAL at 22:16

## 2020-06-18 RX ADMIN — SULFAMETHOXAZOLE AND TRIMETHOPRIM 1 TABLET: 800; 160 TABLET ORAL at 22:16

## 2020-06-18 ASSESSMENT — PAIN DESCRIPTION - LOCATION: LOCATION: LEG

## 2020-06-18 ASSESSMENT — PAIN DESCRIPTION - ORIENTATION: ORIENTATION: ANTERIOR;LEFT

## 2020-06-18 ASSESSMENT — PAIN SCALES - GENERAL: PAINLEVEL_OUTOF10: 2

## 2020-06-18 ASSESSMENT — PAIN DESCRIPTION - PAIN TYPE: TYPE: ACUTE PAIN

## 2020-06-19 ENCOUNTER — CARE COORDINATION (OUTPATIENT)
Dept: CARE COORDINATION | Age: 70
End: 2020-06-19

## 2020-06-19 NOTE — ED PROVIDER NOTES
eMERGENCY dEPARTMENT eNCOUnter      Pt Name: Gene Cavazos MRN: [id number]  Birthdate 1950  Date of evaluation: 6/18/2020      CHIEF COMPLAINT       Chief Complaint   Patient presents with    Leg Injury     I-beam dropped on left anterior shin 4 days ago. Increasing pain, redness, and swelling.  Leg Swelling         HISTORY OF PRESENT ILLNESS    Gene Cavazos is a 68 y.o. male who presents with left leg pain, swelling. Patient states on Monday he had dropped an I-beam on his left anterior leg. He was seen in urgent care the following day. X-rays were negative. He was told he had a hematoma today. Has noticed increasing pain and swelling to the area and some redness. Face there is some clear weeping fluid from the area. Denies any numbness, tingling, weakness. Denies any fever, chills         REVIEW OF SYSTEMS       Positive leg pain, swelling. Negative for chest pain, shortness of breath, numbness, tingling or weakness     PAST MEDICAL HISTORY    has a past medical history of COPD (chronic obstructive pulmonary disease) ([address] Fracture of ulnar styloid, Headache(784.0), Hyperlipidemia, Hypertension, MI (myocardial infarction) ([facility] [address] Neck pain, chronic, Posttraumatic stress disorder, and Tremor. SURGICAL HISTORY      has a past surgical history that includes Appendectomy; Vasectomy; Tonsillectomy; Elbow surgery (Left, 1999); Cardiac catheterization (7/10); and Colonoscopy (2015). CURRENT MEDICATIONS       Discharge Medication List as of 6/18/2020 10:10 PM      CONTINUE these medications which have NOT CHANGED    Details   HYDROcodone-acetaminophen (NORCO) 5-325 MG per tablet Take 1 tablet by mouth every 6 hours as needed for Pain for up to 3 days. Intended supply: 3 days.  Take lowest dose possible to manage pain, Disp-12 tablet, R-0Normal      rosuvastatin (CRESTOR) 40 MG tablet Take 60 mg by mouth every evening Takes 1.5 tabs dailyHistorical Med      omeprazole (PRILOSEC) 20 MG MG per tablet     Sig: Take 1 tablet by mouth 2 times daily for 7 days     Dispense:  14 tablet     Refill:  0           Re-evaluation Notes    Patient appears to have developed cellulitis to the area, warm compresses, elevation*many antibiotics. He is instructed to get reevaluated in the morning for possible admission if not any better. Return immediately if worsening symptoms such as chest pain, shortness of breath, or any other concerns        FINAL IMPRESSION      1. Cellulitis of left lower extremity          DISPOSITION/PLAN   DISPOSITION Decision To Discharge 06/18/2020 10:09:40 PM      Condition on Disposition    Stable    PATIENT REFERRED TO:  AIMEE Perdue - [facility]  [address] [address]  [phone]    In 1 day        DISCHARGE MEDICATIONS:  Discharge Medication List as of 6/18/2020 10:10 PM      START taking these medications    Details   cephALEXin (KEFLEX) 500 MG capsule Take 1 capsule by mouth 4 times daily for 7 days, Disp-28 capsule, R-0Print      sulfamethoxazole-trimethoprim (BACTRIM DS) 800-160 MG per tablet Take 1 tablet by mouth 2 times daily for 7 days, Disp-14 tablet, R-0Print             (Please note that portions of this note were completed with a voice recognition program.  Efforts were made to edit the dictations but occasionally words are mis-transcribed.)    Mathews MD, F.A.C.E.P.   Attending Emergency Physician        Gini Mosquera MD  06/19/20 3255

## 2020-06-21 ENCOUNTER — OFFICE VISIT (OUTPATIENT)
Dept: PRIMARY CARE CLINIC | Age: 70
DRG: 603 | End: 2020-06-21
Payer: MEDICARE

## 2020-06-21 ENCOUNTER — HOSPITAL ENCOUNTER (INPATIENT)
Age: 70
LOS: 3 days | Discharge: HOME | DRG: 603 | End: 2020-06-24
Attending: EMERGENCY MEDICINE | Admitting: FAMILY MEDICINE
Payer: MEDICARE

## 2020-06-21 VITALS
HEIGHT: 70 IN | TEMPERATURE: 98.5 F | OXYGEN SATURATION: 95 % | HEART RATE: 80 BPM | DIASTOLIC BLOOD PRESSURE: 82 MMHG | BODY MASS INDEX: 27.06 KG/M2 | WEIGHT: 189 LBS | SYSTOLIC BLOOD PRESSURE: 130 MMHG | RESPIRATION RATE: 16 BRPM

## 2020-06-21 PROBLEM — L03.90 CELLULITIS: Status: ACTIVE | Noted: 2020-06-21

## 2020-06-21 LAB
ABSOLUTE EOS #: 0.24 K/UL (ref 0–0.44)
ABSOLUTE IMMATURE GRANULOCYTE: <0.03 K/UL (ref 0–0.3)
ABSOLUTE LYMPH #: 1.09 K/UL (ref 1.1–3.7)
ABSOLUTE MONO #: 0.75 K/UL (ref 0.1–1.2)
ALBUMIN SERPL-MCNC: 4 G/DL (ref 3.5–5.2)
ALBUMIN/GLOBULIN RATIO: 1.5 (ref 1–2.5)
ALP BLD-CCNC: 70 U/L (ref 40–129)
ALT SERPL-CCNC: 10 U/L (ref 5–41)
ANION GAP SERPL CALCULATED.3IONS-SCNC: 11 MMOL/L (ref 9–17)
AST SERPL-CCNC: 16 U/L
BASOPHILS # BLD: 1 % (ref 0–2)
BASOPHILS ABSOLUTE: 0.04 K/UL (ref 0–0.2)
BILIRUB SERPL-MCNC: 0.3 MG/DL (ref 0.3–1.2)
BILIRUBIN DIRECT: 0.1 MG/DL
BILIRUBIN, INDIRECT: 0.2 MG/DL (ref 0–1)
BUN BLDV-MCNC: 17 MG/DL (ref 8–23)
BUN/CREAT BLD: 17 (ref 9–20)
CALCIUM SERPL-MCNC: 9.1 MG/DL (ref 8.6–10.4)
CHLORIDE BLD-SCNC: 99 MMOL/L (ref 98–107)
CO2: 26 MMOL/L (ref 20–31)
CREAT SERPL-MCNC: 1 MG/DL (ref 0.7–1.2)
DIFFERENTIAL TYPE: ABNORMAL
EOSINOPHILS RELATIVE PERCENT: 5 % (ref 1–4)
GFR AFRICAN AMERICAN: >60 ML/MIN
GFR NON-AFRICAN AMERICAN: >60 ML/MIN
GFR SERPL CREATININE-BSD FRML MDRD: ABNORMAL ML/MIN/{1.73_M2}
GFR SERPL CREATININE-BSD FRML MDRD: ABNORMAL ML/MIN/{1.73_M2}
GLOBULIN: 2.7 G/DL (ref 1.5–3.8)
GLUCOSE BLD-MCNC: 123 MG/DL (ref 70–99)
HCT VFR BLD CALC: 35.2 % (ref 40.7–50.3)
HEMOGLOBIN: 11.9 G/DL (ref 13–17)
IMMATURE GRANULOCYTES: 0 %
INR BLD: 3.1
LACTIC ACID, SEPSIS WHOLE BLOOD: NORMAL MMOL/L (ref 0.5–1.9)
LACTIC ACID, SEPSIS: 0.9 MMOL/L (ref 0.5–1.9)
LYMPHOCYTES # BLD: 20 % (ref 24–43)
MCH RBC QN AUTO: 36.2 PG (ref 25.2–33.5)
MCHC RBC AUTO-ENTMCNC: 33.8 G/DL (ref 25.2–33.5)
MCV RBC AUTO: 107 FL (ref 82.6–102.9)
MONOCYTES # BLD: 14 % (ref 3–12)
NRBC AUTOMATED: 0 PER 100 WBC
PARTIAL THROMBOPLASTIN TIME: 75.4 SEC (ref 23.9–33.8)
PDW BLD-RTO: 14.7 % (ref 11.8–14.4)
PLATELET # BLD: 303 K/UL (ref 138–453)
PLATELET ESTIMATE: ABNORMAL
PMV BLD AUTO: 8.9 FL (ref 8.1–13.5)
POTASSIUM SERPL-SCNC: 4.1 MMOL/L (ref 3.7–5.3)
PROTHROMBIN TIME: 31 SEC (ref 11.5–14.2)
RBC # BLD: 3.29 M/UL (ref 4.21–5.77)
RBC # BLD: ABNORMAL 10*6/UL
SEG NEUTROPHILS: 60 % (ref 36–65)
SEGMENTED NEUTROPHILS ABSOLUTE COUNT: 3.24 K/UL (ref 1.5–8.1)
SODIUM BLD-SCNC: 136 MMOL/L (ref 135–144)
TOTAL PROTEIN: 6.7 G/DL (ref 6.4–8.3)
WBC # BLD: 5.4 K/UL (ref 3.5–11.3)
WBC # BLD: ABNORMAL 10*3/UL

## 2020-06-21 PROCEDURE — 1123F ACP DISCUSS/DSCN MKR DOCD: CPT | Performed by: FAMILY MEDICINE

## 2020-06-21 PROCEDURE — 2060000000 HC ICU INTERMEDIATE R&B

## 2020-06-21 PROCEDURE — 6370000000 HC RX 637 (ALT 250 FOR IP): Performed by: FAMILY MEDICINE

## 2020-06-21 PROCEDURE — 85730 THROMBOPLASTIN TIME PARTIAL: CPT

## 2020-06-21 PROCEDURE — 99212 OFFICE O/P EST SF 10 MIN: CPT

## 2020-06-21 PROCEDURE — 1036F TOBACCO NON-USER: CPT | Performed by: FAMILY MEDICINE

## 2020-06-21 PROCEDURE — 80076 HEPATIC FUNCTION PANEL: CPT

## 2020-06-21 PROCEDURE — 2580000003 HC RX 258: Performed by: EMERGENCY MEDICINE

## 2020-06-21 PROCEDURE — 96374 THER/PROPH/DIAG INJ IV PUSH: CPT

## 2020-06-21 PROCEDURE — G8427 DOCREV CUR MEDS BY ELIG CLIN: HCPCS | Performed by: FAMILY MEDICINE

## 2020-06-21 PROCEDURE — 36415 COLL VENOUS BLD VENIPUNCTURE: CPT

## 2020-06-21 PROCEDURE — 99284 EMERGENCY DEPT VISIT MOD MDM: CPT

## 2020-06-21 PROCEDURE — 2580000003 HC RX 258: Performed by: FAMILY MEDICINE

## 2020-06-21 PROCEDURE — 85610 PROTHROMBIN TIME: CPT

## 2020-06-21 PROCEDURE — 85025 COMPLETE CBC W/AUTO DIFF WBC: CPT

## 2020-06-21 PROCEDURE — 99214 OFFICE O/P EST MOD 30 MIN: CPT | Performed by: FAMILY MEDICINE

## 2020-06-21 PROCEDURE — 96375 TX/PRO/DX INJ NEW DRUG ADDON: CPT

## 2020-06-21 PROCEDURE — 6360000002 HC RX W HCPCS: Performed by: EMERGENCY MEDICINE

## 2020-06-21 PROCEDURE — 4040F PNEUMOC VAC/ADMIN/RCVD: CPT | Performed by: FAMILY MEDICINE

## 2020-06-21 PROCEDURE — G8417 CALC BMI ABV UP PARAM F/U: HCPCS | Performed by: FAMILY MEDICINE

## 2020-06-21 PROCEDURE — 3017F COLORECTAL CA SCREEN DOC REV: CPT | Performed by: FAMILY MEDICINE

## 2020-06-21 PROCEDURE — 80048 BASIC METABOLIC PNL TOTAL CA: CPT

## 2020-06-21 PROCEDURE — 87040 BLOOD CULTURE FOR BACTERIA: CPT

## 2020-06-21 PROCEDURE — 83605 ASSAY OF LACTIC ACID: CPT

## 2020-06-21 RX ORDER — ALBUTEROL SULFATE 2.5 MG/3ML
2.5 SOLUTION RESPIRATORY (INHALATION) EVERY 4 HOURS PRN
Status: DISCONTINUED | OUTPATIENT
Start: 2020-06-21 | End: 2020-06-24 | Stop reason: HOSPADM

## 2020-06-21 RX ORDER — ACETAMINOPHEN 325 MG/1
650 TABLET ORAL EVERY 6 HOURS PRN
Status: DISCONTINUED | OUTPATIENT
Start: 2020-06-21 | End: 2020-06-24 | Stop reason: HOSPADM

## 2020-06-21 RX ORDER — BUDESONIDE AND FORMOTEROL FUMARATE DIHYDRATE 80; 4.5 UG/1; UG/1
2 AEROSOL RESPIRATORY (INHALATION) 2 TIMES DAILY
Status: DISCONTINUED | OUTPATIENT
Start: 2020-06-21 | End: 2020-06-24 | Stop reason: HOSPADM

## 2020-06-21 RX ORDER — MULTIVITAMIN WITH IRON
1 TABLET ORAL DAILY
Status: DISCONTINUED | OUTPATIENT
Start: 2020-06-21 | End: 2020-06-24 | Stop reason: HOSPADM

## 2020-06-21 RX ORDER — POLYVINYL ALCOHOL 14 MG/ML
1 SOLUTION/ DROPS OPHTHALMIC 3 TIMES DAILY
Status: DISCONTINUED | OUTPATIENT
Start: 2020-06-21 | End: 2020-06-24 | Stop reason: HOSPADM

## 2020-06-21 RX ORDER — THEOPHYLLINE ANHYDROUS 100 MG
300 TABLET, EXTENDED RELEASE 12 HR ORAL 2 TIMES DAILY
Status: DISCONTINUED | OUTPATIENT
Start: 2020-06-21 | End: 2020-06-22

## 2020-06-21 RX ORDER — SODIUM CHLORIDE 0.9 % (FLUSH) 0.9 %
10 SYRINGE (ML) INJECTION EVERY 12 HOURS SCHEDULED
Status: DISCONTINUED | OUTPATIENT
Start: 2020-06-21 | End: 2020-06-24 | Stop reason: HOSPADM

## 2020-06-21 RX ORDER — ACETAMINOPHEN 650 MG/1
650 SUPPOSITORY RECTAL EVERY 6 HOURS PRN
Status: DISCONTINUED | OUTPATIENT
Start: 2020-06-21 | End: 2020-06-22

## 2020-06-21 RX ORDER — VANCOMYCIN HYDROCHLORIDE 500 MG/10ML
INJECTION, POWDER, LYOPHILIZED, FOR SOLUTION INTRAVENOUS
Status: DISPENSED
Start: 2020-06-21 | End: 2020-06-22

## 2020-06-21 RX ORDER — VANCOMYCIN HYDROCHLORIDE 1 G/20ML
INJECTION, POWDER, LYOPHILIZED, FOR SOLUTION INTRAVENOUS
Status: DISPENSED
Start: 2020-06-21 | End: 2020-06-22

## 2020-06-21 RX ORDER — GABAPENTIN 600 MG/1
600 TABLET ORAL 3 TIMES DAILY
Status: DISCONTINUED | OUTPATIENT
Start: 2020-06-21 | End: 2020-06-24 | Stop reason: HOSPADM

## 2020-06-21 RX ORDER — SODIUM CHLORIDE 0.9 % (FLUSH) 0.9 %
10 SYRINGE (ML) INJECTION PRN
Status: DISCONTINUED | OUTPATIENT
Start: 2020-06-21 | End: 2020-06-24 | Stop reason: HOSPADM

## 2020-06-21 RX ORDER — HYDROCODONE BITARTRATE AND ACETAMINOPHEN 5; 325 MG/1; MG/1
1 TABLET ORAL EVERY 4 HOURS PRN
Status: DISCONTINUED | OUTPATIENT
Start: 2020-06-21 | End: 2020-06-22

## 2020-06-21 RX ORDER — PROMETHAZINE HYDROCHLORIDE 25 MG/1
12.5 TABLET ORAL EVERY 6 HOURS PRN
Status: DISCONTINUED | OUTPATIENT
Start: 2020-06-21 | End: 2020-06-24 | Stop reason: HOSPADM

## 2020-06-21 RX ORDER — POLYETHYLENE GLYCOL 3350 17 G/17G
17 POWDER, FOR SOLUTION ORAL DAILY PRN
Status: DISCONTINUED | OUTPATIENT
Start: 2020-06-21 | End: 2020-06-24 | Stop reason: HOSPADM

## 2020-06-21 RX ORDER — PANTOPRAZOLE SODIUM 40 MG/1
40 TABLET, DELAYED RELEASE ORAL
Status: DISCONTINUED | OUTPATIENT
Start: 2020-06-21 | End: 2020-06-24 | Stop reason: HOSPADM

## 2020-06-21 RX ORDER — ONDANSETRON 2 MG/ML
4 INJECTION INTRAMUSCULAR; INTRAVENOUS EVERY 6 HOURS PRN
Status: DISCONTINUED | OUTPATIENT
Start: 2020-06-21 | End: 2020-06-24 | Stop reason: HOSPADM

## 2020-06-21 RX ORDER — HYDROXYUREA 500 MG/1
1000 CAPSULE ORAL DAILY
Status: DISCONTINUED | OUTPATIENT
Start: 2020-06-22 | End: 2020-06-24 | Stop reason: HOSPADM

## 2020-06-21 RX ORDER — ROSUVASTATIN CALCIUM 20 MG/1
40 TABLET, COATED ORAL EVERY EVENING
Status: DISCONTINUED | OUTPATIENT
Start: 2020-06-21 | End: 2020-06-24 | Stop reason: HOSPADM

## 2020-06-21 RX ORDER — ONDANSETRON 2 MG/ML
4 INJECTION INTRAMUSCULAR; INTRAVENOUS ONCE
Status: COMPLETED | OUTPATIENT
Start: 2020-06-21 | End: 2020-06-21

## 2020-06-21 RX ORDER — LANOLIN ALCOHOL/MO/W.PET/CERES
1500 CREAM (GRAM) TOPICAL DAILY
Status: DISCONTINUED | OUTPATIENT
Start: 2020-06-21 | End: 2020-06-24 | Stop reason: HOSPADM

## 2020-06-21 RX ADMIN — VANCOMYCIN HYDROCHLORIDE 1250 MG: 500 INJECTION, POWDER, LYOPHILIZED, FOR SOLUTION INTRAVENOUS at 16:23

## 2020-06-21 RX ADMIN — HYDROCODONE BITARTRATE AND ACETAMINOPHEN 1 TABLET: 5; 325 TABLET ORAL at 23:32

## 2020-06-21 RX ADMIN — ONDANSETRON 4 MG: 2 INJECTION INTRAMUSCULAR; INTRAVENOUS at 16:07

## 2020-06-21 RX ADMIN — HYDROMORPHONE HYDROCHLORIDE 0.5 MG: 1 INJECTION, SOLUTION INTRAMUSCULAR; INTRAVENOUS; SUBCUTANEOUS at 16:08

## 2020-06-21 RX ADMIN — GABAPENTIN 600 MG: 600 TABLET, FILM COATED ORAL at 21:05

## 2020-06-21 RX ADMIN — PIPERACILLIN AND TAZOBACTAM 4.5 G: 4; .5 INJECTION, POWDER, FOR SOLUTION INTRAVENOUS at 18:08

## 2020-06-21 RX ADMIN — Medication 10 ML: at 21:05

## 2020-06-21 ASSESSMENT — PAIN SCALES - GENERAL
PAINLEVEL_OUTOF10: 10
PAINLEVEL_OUTOF10: 0
PAINLEVEL_OUTOF10: 0
PAINLEVEL_OUTOF10: 1
PAINLEVEL_OUTOF10: 2
PAINLEVEL_OUTOF10: 2
PAINLEVEL_OUTOF10: 3
PAINLEVEL_OUTOF10: 1

## 2020-06-21 ASSESSMENT — PAIN DESCRIPTION - LOCATION
LOCATION: LEG

## 2020-06-21 ASSESSMENT — PAIN DESCRIPTION - FREQUENCY
FREQUENCY: CONTINUOUS
FREQUENCY: CONTINUOUS

## 2020-06-21 ASSESSMENT — PAIN - FUNCTIONAL ASSESSMENT: PAIN_FUNCTIONAL_ASSESSMENT: ACTIVITIES ARE NOT PREVENTED

## 2020-06-21 ASSESSMENT — PAIN DESCRIPTION - ORIENTATION
ORIENTATION: LEFT
ORIENTATION: LEFT;LOWER
ORIENTATION: LEFT;LOWER

## 2020-06-21 ASSESSMENT — PAIN DESCRIPTION - DESCRIPTORS
DESCRIPTORS: CONSTANT;SORE
DESCRIPTORS: ACHING;CONSTANT

## 2020-06-21 ASSESSMENT — ENCOUNTER SYMPTOMS
NAUSEA: 0
SHORTNESS OF BREATH: 0
COUGH: 0
CHEST TIGHTNESS: 0
SHORTNESS OF BREATH: 0
COLOR CHANGE: 1
WHEEZING: 0
COUGH: 0

## 2020-06-21 ASSESSMENT — PAIN DESCRIPTION - PAIN TYPE
TYPE: ACUTE PAIN

## 2020-06-21 ASSESSMENT — PAIN DESCRIPTION - ONSET: ONSET: ON-GOING

## 2020-06-21 NOTE — PROGRESS NOTES
Ptvoiced understanding. Reviewed health maintenance. Instructed to continue currentmedications, diet and exercise. Patient agreed with treatment plan. Follow up asdirected.      Electronically signed by Chiqui Trejo MD on 6/21/2020 at 2:30 PM

## 2020-06-21 NOTE — ED PROVIDER NOTES
[facility] ED  EMERGENCY DEPARTMENT ENCOUNTER      Pt Name: Leatha Ricks MRN: [id number]  Birthdate 1950  Date of evaluation: 6/21/2020  Provider: Kelli Moore MD    CHIEF COMPLAINT     Chief Complaint   Patient presents with    Leg Injury     about a week ago  dropped I beam on left lower [address] #    Cellulitis     getting worse has been on antibiotic for couple days         HISTORY OF PRESENT ILLNESS   (Location/Symptom, Timing/Onset, Context/Setting,Quality, Duration, Modifying Factors, Severity)  Note limiting factors. Leatha Ricks is a 68 y.o. male who presents to the emergency department for evaluation of failed outpatient antibiotic treatment of cellulitis of the left lower leg. Patient states a piece of farm equipment, and I-beam fell on his left shin 1 week ago. A couple days later he had an outpatient x-ray which did not reveal any fracture. The following day presented to the ED because of swelling and redness. He was diagnosed with hematoma and cellulitis and was placed on Bactrim and Keflex. Patient is on warfarin for peripheral vascular disease. He also has a history of thrombocytosis for which he takes hydroxyurea. He does not report chills or fever. He was seen in the clinic today and was directed to come to the ED from there because he has had worsening of the cellulitis even with oral antibiotics. The history is provided by the patient and medical records. Nursing Notes werereviewed. REVIEW OF SYSTEMS    (2-9 systems for level 4, 10 or more for level 5)     Review of Systems   Constitutional: Negative for fever. Respiratory: Negative for cough and shortness of breath. All other systems reviewed and are negative. Except as noted above the remainder of the review of systems was reviewed and negative.        PAST MEDICAL HISTORY     Past Medical History:   Diagnosis Date    COPD (chronic obstructive pulmonary disease) [address] [address]     Fracture of ulnar styloid     Headache(784.0)     Tesion    Hyperlipidemia     Hypertension     MI (myocardial infarction) ([facility] [address]     Neck pain, chronic     Posttraumatic stress disorder     with panic disorder and anxiety    Tremor          SURGICALHISTORY       Past Surgical History:   Procedure Laterality Date    APPENDECTOMY      CARDIAC CATHETERIZATION  7/10    COLONOSCOPY  2015    ELBOW SURGERY Left 1999    TONSILLECTOMY      VASECTOMY           CURRENT MEDICATIONS       Previous Medications    ALBUTEROL SULFATE HFA (PROAIR HFA) 108 (90 BASE) MCG/ACT INHALER    Inhale 2 puffs into the lungs every 6 hours as needed for Wheezing or Shortness of Breath    BUDESONIDE-FORMOTEROL FUMARATE (SYMBICORT IN)    Inhale 2 puffs into the lungs daily    CEPHALEXIN (KEFLEX) 500 MG CAPSULE    Take 1 capsule by mouth 4 times daily for 7 days    CYANOCOBALAMIN (VITAMIN B-12) 1000 MCG EXTENDED RELEASE TABLET    Take 1,500 mcg by mouth daily    GABAPENTIN (NEURONTIN) 600 MG TABLET    Take 1 tablet by mouth 3 times daily. HYDROXYUREA (HYDREA) 500 MG CHEMO CAPSULE    Take 1,000 mg by mouth daily    HYPROMELLOSE 0.4 % SOLN OPHTHALMIC SOLUTION    Place 1 drop into both eyes 3 times daily    LOVASTATIN (MEVACOR) 10 MG TABLET    Take 10 mg by mouth daily. METRONIDAZOLE (METROGEL) 0.75 % GEL    Apply topically 2 times daily.     MULTIPLE VITAMINS-MINERALS (MULTIVITAMIN & MINERAL PO)    Take 1 tablet by mouth Indications: When he remembers    OMEPRAZOLE (PRILOSEC) 20 MG DELAYED RELEASE CAPSULE    Take 20 mg by mouth daily    ROSUVASTATIN (CRESTOR) 40 MG TABLET    Take 60 mg by mouth every evening Takes 1.5 tabs daily    SILDENAFIL (VIAGRA) 50 MG TABLET    Take 1 tablet by mouth as needed for Erectile Dysfunction    SULFAMETHOXAZOLE-TRIMETHOPRIM (BACTRIM DS) 800-160 MG PER TABLET    Take 1 tablet by mouth 2 times daily for 7 days    THEOPHYLLINE (THEODUR) 300 MG EXTENDED RELEASE TABLET    Take 1 tablet by mouth 2 times daily VERAPAMIL (CALAN-SR) 180 MG CR TABLET    Take 180 mg by mouth daily. VITAMIN C (ASCORBIC ACID) 500 MG TABLET    Take 500 mg by mouth daily    WARFARIN (COUMADIN) 5 MG TABLET    Take 5 mg by mouth Take1 full tablet MWF and 0.5 tablets on the other days       ALLERGIES     Diclofenac sodium    FAMILY HISTORY       Family History   Problem Relation Age of Onset    High Blood Pressure Mother     Macular Degen Mother     High Blood Pressure Sister     Cancer Sister         ovarian    Macular Degen Sister           SOCIAL HISTORY       Social History     Socioeconomic History    Marital status:      Spouse name: None    Number of children: None    Years of education: None    Highest education level: None   Occupational History    None   Social Needs    Financial resource strain: None    Food insecurity     Worry: None     Inability: None    Transportation needs     Medical: None     Non-medical: None   Tobacco Use    Smoking status: Former Smoker     Packs/day: 0.50     Years: 40.00     Pack years: 20.00     Types: Cigarettes     Last attempt to quit: 10/11/2018     Years since quittin.6    Smokeless tobacco: Never Used    Tobacco comment: uses nicorette   Substance and Sexual Activity    Alcohol use:  Yes     Alcohol/week: 11.7 standard drinks     Types: 14 Standard drinks or equivalent per week    Drug use: Never    Sexual activity: None   Lifestyle    Physical activity     Days per week: None     Minutes per session: None    Stress: None   Relationships    Social connections     Talks on phone: None     Gets together: None     Attends Judaism service: None     Active member of club or organization: None     Attends meetings of clubs or organizations: None     Relationship status: None    Intimate partner violence     Fear of current or ex partner: None     Emotionally abused: None     Physically abused: None     Forced sexual activity: None   Other Topics Concern    None   Social History Narrative    None       SCREENINGS             PHYSICAL EXAM    (up to 7 for level 4, 8 or more for level 5)     ED Triage Vitals [06/21/20 1438]   BP Temp Temp Source Pulse Resp SpO2 Height Weight   (!) 148/76 99.1 °F (37.3 °C) Tympanic 73 16 95 % 5' 10\" (1.778 m) 189 lb (85.7 kg)       Physical Exam  Vitals signs reviewed. Constitutional:       General: He is not in acute distress. Appearance: Normal appearance. He is not ill-appearing. HENT:      Head: Normocephalic. Right Ear: External ear normal.      Left Ear: External ear normal.      Nose: Nose normal.      Mouth/Throat:      Mouth: Mucous membranes are moist.   Eyes:      Extraocular Movements: Extraocular movements intact. Neck:      Musculoskeletal: Neck supple. Cardiovascular:      Rate and Rhythm: Normal rate and regular rhythm. Heart sounds: Normal heart sounds. Pulmonary:      Effort: Pulmonary effort is normal.      Breath sounds: Normal breath sounds. Abdominal:      Palpations: Abdomen is soft. Tenderness: There is no abdominal tenderness. Musculoskeletal: Normal range of motion. Comments: Patient has a dome-shaped swelling over the anterior border of the mid distal left lower leg with superficial scab and surrounding erythema consistent with hematoma with surrounding cellulitis. Neurovascular function is intact distally. Skin:     General: Skin is warm and dry. Coloration: Skin is not pale. Findings: No rash. Neurological:      General: No focal deficit present. Mental Status: He is alert. Mental status is at baseline.    Psychiatric:         Mood and Affect: Mood normal.         DIAGNOSTIC RESULTS     EKG: All EKG's are interpreted by the Emergency Department Physician who either signs orCo-signs this chart in the absence of a cardiologist.    RADIOLOGY:   Non-plain film images such as CT, Ultrasound and MRI are read by the radiologist. Plain radiographic images are visualized and To Admit 06/21/2020 04:24:36 PM      PATIENT REFERRED TO:  No follow-up provider specified. DISCHARGE MEDICATIONS:         Problem List:  Patient Active Problem List   Diagnosis Code    Abnormal ECG R94.31    RBBB I45.10    Other chest pain R07.89    LEDESMA (dyspnea on exertion) R06.09    Chronic obstructive pulmonary disease ([facility] [address] J44.9           Summation      Patient Course: Admitted. ED Medicationsadministered this visit:    Medications   vancomycin (VANCOCIN) intermittent dosing (placeholder) ( Other Given 6/21/20 1621)   vancomycin (VANCOCIN) 500 MG injection (  Not Given 6/21/20 1622)   vancomycin (VANCOCIN) 1 g injection (  Not Given 6/21/20 1621)   vancomycin (VANCOCIN) 1,250 mg in dextrose 5 % 250 mL IVPB (1,250 mg Intravenous New Bag 6/21/20 1623)   piperacillin-tazobactam (ZOSYN) 4.5 g in dextrose 5 % 100 mL IVPB (mini-bag) (has no administration in time range)   ondansetron (ZOFRAN) injection 4 mg (4 mg Intravenous Given 6/21/20 1607)   HYDROmorphone (DILAUDID) injection 0.5 mg (0.5 mg Intravenous Given 6/21/20 1608)       New Prescriptions from this visit:    New Prescriptions    No medications on file       Follow-up:  No follow-up provider specified. Final Impression:   1.  Cellulitis of left lower extremity               (Please note that portions of this note were completed with a voice recognitionprogram.  Efforts were made to edit the dictations but occasionally words are mis-transcribed.)    Leslie Harrell MD (electronically signed)  Attending Emergency Physician            Leslie Harrell MD  06/21/20 [id number]

## 2020-06-22 ENCOUNTER — APPOINTMENT (OUTPATIENT)
Dept: GENERAL RADIOLOGY | Age: 70
DRG: 603 | End: 2020-06-22
Payer: MEDICARE

## 2020-06-22 PROBLEM — F32.A DEPRESSION: Status: ACTIVE | Noted: 2020-06-22

## 2020-06-22 PROBLEM — D75.839 THROMBOCYTOSIS: Status: ACTIVE | Noted: 2020-06-22

## 2020-06-22 PROBLEM — L71.9 ROSACEA: Status: ACTIVE | Noted: 2020-06-22

## 2020-06-22 PROBLEM — R07.89 OTHER CHEST PAIN: Status: RESOLVED | Noted: 2018-09-10 | Resolved: 2020-06-22

## 2020-06-22 PROBLEM — I73.9 PERIPHERAL VASCULAR DISEASE (HCC): Status: ACTIVE | Noted: 2020-06-22

## 2020-06-22 PROBLEM — G62.9 PERIPHERAL NEUROPATHY: Status: ACTIVE | Noted: 2020-06-22

## 2020-06-22 PROBLEM — R33.9 URINARY RETENTION: Status: ACTIVE | Noted: 2020-06-22

## 2020-06-22 LAB
-: NORMAL
ABSOLUTE EOS #: 0.26 K/UL (ref 0–0.44)
ABSOLUTE IMMATURE GRANULOCYTE: <0.03 K/UL (ref 0–0.3)
ABSOLUTE LYMPH #: 1.35 K/UL (ref 1.1–3.7)
ABSOLUTE MONO #: 0.71 K/UL (ref 0.1–1.2)
AMORPHOUS: NORMAL
ANION GAP SERPL CALCULATED.3IONS-SCNC: 10 MMOL/L (ref 9–17)
BACTERIA: NORMAL
BASOPHILS # BLD: 1 % (ref 0–2)
BASOPHILS ABSOLUTE: 0.04 K/UL (ref 0–0.2)
BILIRUBIN URINE: NEGATIVE
BUN BLDV-MCNC: 16 MG/DL (ref 8–23)
BUN/CREAT BLD: 18 (ref 9–20)
CALCIUM SERPL-MCNC: 8.9 MG/DL (ref 8.6–10.4)
CASTS UA: NORMAL /LPF (ref 0–2)
CHLORIDE BLD-SCNC: 102 MMOL/L (ref 98–107)
CO2: 26 MMOL/L (ref 20–31)
COLOR: ABNORMAL
COMMENT UA: ABNORMAL
CREAT SERPL-MCNC: 0.89 MG/DL (ref 0.7–1.2)
CRYSTALS, UA: NORMAL /HPF
DIFFERENTIAL TYPE: ABNORMAL
EOSINOPHILS RELATIVE PERCENT: 5 % (ref 1–4)
EPITHELIAL CELLS UA: NORMAL /HPF (ref 0–5)
GFR AFRICAN AMERICAN: >60 ML/MIN
GFR NON-AFRICAN AMERICAN: >60 ML/MIN
GFR SERPL CREATININE-BSD FRML MDRD: ABNORMAL ML/MIN/{1.73_M2}
GFR SERPL CREATININE-BSD FRML MDRD: ABNORMAL ML/MIN/{1.73_M2}
GLUCOSE BLD-MCNC: 112 MG/DL (ref 70–99)
GLUCOSE URINE: NEGATIVE
HCT VFR BLD CALC: 34.2 % (ref 40.7–50.3)
HEMOGLOBIN: 11.4 G/DL (ref 13–17)
IMMATURE GRANULOCYTES: 0 %
INR BLD: 3.1
KETONES, URINE: NEGATIVE
LEUKOCYTE ESTERASE, URINE: NEGATIVE
LYMPHOCYTES # BLD: 24 % (ref 24–43)
MAGNESIUM: 2.3 MG/DL (ref 1.6–2.6)
MCH RBC QN AUTO: 36.2 PG (ref 25.2–33.5)
MCHC RBC AUTO-ENTMCNC: 33.3 G/DL (ref 25.2–33.5)
MCV RBC AUTO: 108.6 FL (ref 82.6–102.9)
MONOCYTES # BLD: 13 % (ref 3–12)
MUCUS: NORMAL
NITRITE, URINE: NEGATIVE
NRBC AUTOMATED: 0 PER 100 WBC
OTHER OBSERVATIONS UA: NORMAL
PDW BLD-RTO: 15.2 % (ref 11.8–14.4)
PH UA: 6 (ref 5–6)
PLATELET # BLD: 278 K/UL (ref 138–453)
PLATELET ESTIMATE: ABNORMAL
PMV BLD AUTO: 8.8 FL (ref 8.1–13.5)
POTASSIUM SERPL-SCNC: 4.4 MMOL/L (ref 3.7–5.3)
PROTEIN UA: NEGATIVE
PROTHROMBIN TIME: 31.2 SEC (ref 11.5–14.2)
RBC # BLD: 3.15 M/UL (ref 4.21–5.77)
RBC # BLD: ABNORMAL 10*6/UL
RBC UA: NORMAL /HPF (ref 0–4)
RENAL EPITHELIAL, UA: NORMAL /HPF
SEG NEUTROPHILS: 57 % (ref 36–65)
SEGMENTED NEUTROPHILS ABSOLUTE COUNT: 3.24 K/UL (ref 1.5–8.1)
SODIUM BLD-SCNC: 138 MMOL/L (ref 135–144)
SPECIFIC GRAVITY UA: 1 (ref 1.01–1.02)
TRICHOMONAS: NORMAL
TURBIDITY: ABNORMAL
URINE HGB: NEGATIVE
UROBILINOGEN, URINE: NORMAL
WBC # BLD: 5.6 K/UL (ref 3.5–11.3)
WBC # BLD: ABNORMAL 10*3/UL
WBC UA: NORMAL /HPF (ref 0–4)
YEAST: NORMAL

## 2020-06-22 PROCEDURE — 2060000000 HC ICU INTERMEDIATE R&B

## 2020-06-22 PROCEDURE — 51798 US URINE CAPACITY MEASURE: CPT

## 2020-06-22 PROCEDURE — 94760 N-INVAS EAR/PLS OXIMETRY 1: CPT

## 2020-06-22 PROCEDURE — 6360000002 HC RX W HCPCS: Performed by: NURSE PRACTITIONER

## 2020-06-22 PROCEDURE — 6370000000 HC RX 637 (ALT 250 FOR IP): Performed by: NURSE PRACTITIONER

## 2020-06-22 PROCEDURE — 6370000000 HC RX 637 (ALT 250 FOR IP): Performed by: FAMILY MEDICINE

## 2020-06-22 PROCEDURE — 2580000003 HC RX 258: Performed by: NURSE PRACTITIONER

## 2020-06-22 PROCEDURE — 83735 ASSAY OF MAGNESIUM: CPT

## 2020-06-22 PROCEDURE — 99223 1ST HOSP IP/OBS HIGH 75: CPT | Performed by: FAMILY MEDICINE

## 2020-06-22 PROCEDURE — 73590 X-RAY EXAM OF LOWER LEG: CPT

## 2020-06-22 PROCEDURE — 94664 DEMO&/EVAL PT USE INHALER: CPT

## 2020-06-22 PROCEDURE — APPSS45 APP SPLIT SHARED TIME 31-45 MINUTES: Performed by: NURSE PRACTITIONER

## 2020-06-22 PROCEDURE — 2580000003 HC RX 258: Performed by: FAMILY MEDICINE

## 2020-06-22 PROCEDURE — 94150 VITAL CAPACITY TEST: CPT

## 2020-06-22 PROCEDURE — 80048 BASIC METABOLIC PNL TOTAL CA: CPT

## 2020-06-22 PROCEDURE — 81001 URINALYSIS AUTO W/SCOPE: CPT

## 2020-06-22 PROCEDURE — 85025 COMPLETE CBC W/AUTO DIFF WBC: CPT

## 2020-06-22 PROCEDURE — 36415 COLL VENOUS BLD VENIPUNCTURE: CPT

## 2020-06-22 PROCEDURE — 2580000003 HC RX 258: Performed by: EMERGENCY MEDICINE

## 2020-06-22 PROCEDURE — 85610 PROTHROMBIN TIME: CPT

## 2020-06-22 PROCEDURE — 6360000002 HC RX W HCPCS: Performed by: EMERGENCY MEDICINE

## 2020-06-22 RX ORDER — HYDROCODONE BITARTRATE AND ACETAMINOPHEN 5; 325 MG/1; MG/1
2 TABLET ORAL EVERY 4 HOURS PRN
Status: DISCONTINUED | OUTPATIENT
Start: 2020-06-22 | End: 2020-06-24 | Stop reason: HOSPADM

## 2020-06-22 RX ORDER — BETHANECHOL CHLORIDE 25 MG/1
25 TABLET ORAL 3 TIMES DAILY
Status: DISCONTINUED | OUTPATIENT
Start: 2020-06-22 | End: 2020-06-24 | Stop reason: HOSPADM

## 2020-06-22 RX ORDER — ALBUTEROL SULFATE 2.5 MG/3ML
2.5 SOLUTION RESPIRATORY (INHALATION)
Status: DISCONTINUED | OUTPATIENT
Start: 2020-06-22 | End: 2020-06-22

## 2020-06-22 RX ORDER — VANCOMYCIN HYDROCHLORIDE 1 G/20ML
INJECTION, POWDER, LYOPHILIZED, FOR SOLUTION INTRAVENOUS
Status: DISPENSED
Start: 2020-06-22 | End: 2020-06-22

## 2020-06-22 RX ORDER — SODIUM CHLORIDE FOR INHALATION 0.9 %
3 VIAL, NEBULIZER (ML) INHALATION
Status: DISCONTINUED | OUTPATIENT
Start: 2020-06-22 | End: 2020-06-24 | Stop reason: HOSPADM

## 2020-06-22 RX ORDER — THEOPHYLLINE 300 MG/1
300 TABLET, EXTENDED RELEASE ORAL 2 TIMES DAILY
Status: DISCONTINUED | OUTPATIENT
Start: 2020-06-22 | End: 2020-06-24 | Stop reason: HOSPADM

## 2020-06-22 RX ORDER — HYDROCODONE BITARTRATE AND ACETAMINOPHEN 5; 325 MG/1; MG/1
1 TABLET ORAL EVERY 4 HOURS PRN
Status: DISCONTINUED | OUTPATIENT
Start: 2020-06-22 | End: 2020-06-24 | Stop reason: HOSPADM

## 2020-06-22 RX ADMIN — THEOPHYLLINE 300 MG: 300 TABLET, EXTENDED RELEASE ORAL at 20:48

## 2020-06-22 RX ADMIN — Medication 10 ML: at 20:50

## 2020-06-22 RX ADMIN — PIPERACILLIN AND TAZOBACTAM 3.38 G: 3; .375 INJECTION, POWDER, LYOPHILIZED, FOR SOLUTION INTRAVENOUS at 15:50

## 2020-06-22 RX ADMIN — ROSUVASTATIN 40 MG: 20 TABLET, FILM COATED ORAL at 20:48

## 2020-06-22 RX ADMIN — GABAPENTIN 600 MG: 600 TABLET, FILM COATED ORAL at 20:48

## 2020-06-22 RX ADMIN — POLYVINYL ALCOHOL 1 DROP: 14 SOLUTION/ DROPS OPHTHALMIC at 13:14

## 2020-06-22 RX ADMIN — VERAPAMIL HYDROCHLORIDE 180 MG: 180 TABLET, FILM COATED, EXTENDED RELEASE ORAL at 08:35

## 2020-06-22 RX ADMIN — Medication 10 ML: at 23:44

## 2020-06-22 RX ADMIN — VANCOMYCIN HYDROCHLORIDE 1250 MG: 500 INJECTION, POWDER, LYOPHILIZED, FOR SOLUTION INTRAVENOUS at 05:08

## 2020-06-22 RX ADMIN — POLYVINYL ALCOHOL 1 DROP: 14 SOLUTION/ DROPS OPHTHALMIC at 20:50

## 2020-06-22 RX ADMIN — HYDROCODONE BITARTRATE AND ACETAMINOPHEN 1 TABLET: 5; 325 TABLET ORAL at 05:08

## 2020-06-22 RX ADMIN — GABAPENTIN 600 MG: 600 TABLET, FILM COATED ORAL at 08:35

## 2020-06-22 RX ADMIN — BETHANECHOL CHLORIDE 25 MG: 25 TABLET ORAL at 20:48

## 2020-06-22 RX ADMIN — HYDROCODONE BITARTRATE AND ACETAMINOPHEN 1 TABLET: 5; 325 TABLET ORAL at 21:18

## 2020-06-22 RX ADMIN — HYDROXYUREA 1000 MG: 500 CAPSULE ORAL at 08:34

## 2020-06-22 RX ADMIN — PIPERACILLIN AND TAZOBACTAM 3.38 G: 3; .375 INJECTION, POWDER, LYOPHILIZED, FOR SOLUTION INTRAVENOUS at 08:41

## 2020-06-22 RX ADMIN — HYDROCODONE BITARTRATE AND ACETAMINOPHEN 1 TABLET: 5; 325 TABLET ORAL at 15:30

## 2020-06-22 RX ADMIN — GABAPENTIN 600 MG: 600 TABLET, FILM COATED ORAL at 13:11

## 2020-06-22 RX ADMIN — VANCOMYCIN HYDROCHLORIDE 1250 MG: 500 INJECTION, POWDER, LYOPHILIZED, FOR SOLUTION INTRAVENOUS at 17:21

## 2020-06-22 RX ADMIN — PIPERACILLIN AND TAZOBACTAM 3.38 G: 3; .375 INJECTION, POWDER, LYOPHILIZED, FOR SOLUTION INTRAVENOUS at 23:44

## 2020-06-22 RX ADMIN — Medication 10 ML: at 08:35

## 2020-06-22 RX ADMIN — BETHANECHOL CHLORIDE 25 MG: 25 TABLET ORAL at 13:11

## 2020-06-22 RX ADMIN — CYANOCOBALAMIN TAB 1000 MCG 1500 MCG: 1000 TAB at 08:41

## 2020-06-22 RX ADMIN — THERA TABS 1 TABLET: TAB at 08:35

## 2020-06-22 ASSESSMENT — PAIN DESCRIPTION - FREQUENCY
FREQUENCY: CONTINUOUS
FREQUENCY: CONTINUOUS

## 2020-06-22 ASSESSMENT — PAIN - FUNCTIONAL ASSESSMENT
PAIN_FUNCTIONAL_ASSESSMENT: ACTIVITIES ARE NOT PREVENTED
PAIN_FUNCTIONAL_ASSESSMENT: ACTIVITIES ARE NOT PREVENTED

## 2020-06-22 ASSESSMENT — PAIN SCALES - GENERAL
PAINLEVEL_OUTOF10: 1
PAINLEVEL_OUTOF10: 0
PAINLEVEL_OUTOF10: 5
PAINLEVEL_OUTOF10: 3
PAINLEVEL_OUTOF10: 0
PAINLEVEL_OUTOF10: 5
PAINLEVEL_OUTOF10: 0
PAINLEVEL_OUTOF10: 5
PAINLEVEL_OUTOF10: 2
PAINLEVEL_OUTOF10: 0
PAINLEVEL_OUTOF10: 0

## 2020-06-22 ASSESSMENT — PAIN DESCRIPTION - ONSET
ONSET: ON-GOING
ONSET: ON-GOING

## 2020-06-22 ASSESSMENT — PAIN DESCRIPTION - LOCATION
LOCATION: LEG

## 2020-06-22 ASSESSMENT — PAIN DESCRIPTION - ORIENTATION
ORIENTATION: LEFT;LOWER

## 2020-06-22 ASSESSMENT — PAIN DESCRIPTION - DESCRIPTORS
DESCRIPTORS: ACHING;CONSTANT
DESCRIPTORS: ACHING;CONSTANT

## 2020-06-22 ASSESSMENT — PAIN DESCRIPTION - PAIN TYPE
TYPE: ACUTE PAIN

## 2020-06-22 NOTE — FLOWSHEET NOTE
Assessment: Patient is alone in room. He engaged easily in conversation. Patient referred to advance directives. He is interested and is provided the documents but he does not wish to discuss without his wife. Patient is a  having served in the [employer] in [facility] and was given a pin. Intervention: Engaged in conversation. Patient expressed appreciation for visit and offer of continued prayer. Plan: Chaplains are available on site or on call 24/7 for spiritual and emotional support. Follow up when possible for advance directives.      06/22/20 1131   Encounter Summary   Services provided to: Patient   Referral/Consult From: [facility]   Support System Spouse   Continue Visiting   (6/22/20)   Complexity of Encounter Moderate   Length of Encounter 15 minutes   Routine   Type Initial   Assessment Approachable   Intervention Active listening   Outcome Expressed gratitude;Engaged in conversation

## 2020-06-22 NOTE — PROGRESS NOTES
[facility] Garcia[facility] Cellulitis [L03.90] . Vitals:    06/22/20 0500   BP: 133/69   Pulse: 61   Resp: 14   Temp: 98.1 °F (36.7 °C)   SpO2: 91%   . Patients home meds are   Prior to Admission medications    Medication Sig Start Date End Date Taking? Authorizing Provider   cephALEXin (KEFLEX) 500 MG capsule Take 1 capsule by mouth 4 times daily for 7 days 6/18/20 6/25/20 Yes Cristian Dickerson MD   sulfamethoxazole-trimethoprim (BACTRIM DS) 800-160 MG per tablet Take 1 tablet by mouth 2 times daily for 7 days 6/18/20 6/25/20 Yes Cristian Dickerson MD   omeprazole (PRILOSEC) 20 MG delayed release capsule Take 20 mg by mouth daily   Yes Historical Provider, MD   gabapentin (NEURONTIN) 600 MG tablet Take 1 tablet by mouth 3 times daily. 1/7/20  Yes Historical Provider, MD   warfarin (COUMADIN) 5 MG tablet Take 5 mg by mouth Take1 full tablet MWF and 0.5 tablets on the other days   Yes Historical Provider, MD   vitamin C (ASCORBIC ACID) 500 MG tablet Take 500 mg by mouth daily   Yes Historical Provider, MD   hydroxyurea (HYDREA) 500 MG chemo capsule Take 1,000 mg by mouth daily   Yes Historical Provider, MD   metroNIDAZOLE (METROGEL) 0.75 % gel Apply topically 2 times daily.  4/12/19  Yes AIMEE Garcia CNP   theophylline (THEODUR) 300 MG extended release tablet Take 1 tablet by mouth 2 times daily   Yes Historical Provider, MD   Budesonide-Formoterol Fumarate (SYMBICORT IN) Inhale 2 puffs into the lungs daily   Yes Historical Provider, MD   albuterol sulfate HFA (PROAIR HFA) 108 (90 Base) MCG/ACT inhaler Inhale 2 puffs into the lungs every 6 hours as needed for Wheezing or Shortness of Breath 8/31/18  Yes AIMEE Garcia CNP   Cyanocobalamin (VITAMIN B-12) 1000 MCG extended release tablet Take 1,500 mcg by mouth daily   Yes Historical Provider, MD   Multiple Vitamins-Minerals (MULTIVITAMIN & MINERAL PO) Take 1 tablet by mouth Indications: When he remembers   Yes Historical

## 2020-06-22 NOTE — H&P
by mouth every evening Takes 1.5 tabs daily  sildenafil (VIAGRA) 50 MG tablet, Take 1 tablet by mouth as needed for Erectile Dysfunction    Allergies:    Diclofenac sodium    Social History:    reports that he quit smoking about 20 months ago. His smoking use included cigarettes. He has a 20.00 pack-year smoking history. He has never used smokeless tobacco. He reports current alcohol use of about 11.7 standard drinks of alcohol per week. He reports that he does not use drugs. Family History:   family history includes Cancer in his sister; High Blood Pressure in his mother and sister; [place] in his mother and sister. REVIEW OF SYSTEMS:  See HPI and problem list; otherwise no other new complaints with respect to eyes, ENT, neck, pulmonary, coronary, chest, GI, , endocrine, musculoskeletal, immune system/connective tissue disease, hematologic, neurologic, psychiatric, skin, lymphatics, or malignancies. Code status discussed with patient/family--wishes for Full Code at this time. PHYSICAL EXAM:  Vitals:  /69   Pulse 61   Temp 98.1 °F (36.7 °C) (Tympanic)   Resp 14   Ht 5' 10\" (1.778 m)   Wt 189 lb (85.7 kg)   SpO2 91%   BMI 27.12 kg/m²     General: awake, alert and cooperative  HEENT: PERRLA, Mucosa Pink, Moist, EMOI, External nose normal, Normocephalic, Atraumatic and Neck with full ROM  Neck: Supple, Carotid Pulses Present, No Masses, Tenderness, Nodularity and No Lymphadenopathy  Chest/Lungs: Clear to Auscultation without Rales, Rhonchi, or Wheezes, Distant Breath Sounds and Respirations even and unlabored  Cardiac: Regular Rate and Rhythm and Pedal Pulses Palpable Bilaterally  GI/Abdomen:  Bowel Sounds Present and Soft, Non-tender, without Guarding or Rebound Tenderness  : Not examined  Extremities/Musculoskeletal: LLE with 2+ edema  Skin: left anterior lower leg with area of erythema and edema and excessive warmth with central area of early scabbing present, has not extended monitor  5. Alcohol intake -- cessation advised -- monitor for need for CIWA protocol -- patient states he has not been drinking as much alcohol lately due to the bars/restraunts being closed due to COVID  6. Home medications reviewed  7.  See orders     Note that over 50 minutes was spent in evaluation of the patient, review of the chart and pertinent records, discussion with family/staff, etc    Maninder YU, NP-C, FNP-BC  8:11 AM  6/22/2020

## 2020-06-22 NOTE — PROGRESS NOTES
Pharmacy Note  Warfarin Consult follow-up      Recent Labs     06/22/20  0525   INR 3.1     Recent Labs     06/21/20  1517 06/22/20  0525   HGB 11.9* 11.4*   HCT 35.2* 34.2*    278       Significant Drug-Drug Interactions:  New warfarin drug-drug interactions: zosyn, vancoymcin  Discontinued drug-drug interactions: none      Date INR Dose   6/21/20 3.1 Hold   6/22/20 3.1 Hold            Notes:                     Daily PT/INR while inpatient.    Owen Ravi  6/22/2020  11:26 AM

## 2020-06-22 NOTE — PLAN OF CARE
Problem: Discharge Planning:  Goal: Discharged to appropriate level of care  Description: Discharged to appropriate level of care  6/22/2020 0919 by Ceferino Morales RN  Outcome: Ongoing  6/21/2020 1938 by Arpita Nunez RN  Outcome: Ongoing     Problem:  Body Temperature - Imbalanced:  Goal: Ability to maintain a body temperature in the normal range will improve  Description: Ability to maintain a body temperature in the normal range will improve  6/22/2020 0919 by Ceferino Morales RN  Outcome: Ongoing  6/21/2020 1938 by Arpita Nunez RN  Outcome: Ongoing     Problem: Mobility - Impaired:  Goal: Mobility will improve to maximum level  Description: Mobility will improve to maximum level  6/22/2020 0919 by Ceferino Morales RN  Outcome: Ongoing  6/21/2020 1938 by Arpita Nunez RN  Outcome: Ongoing     Problem: Pain:  Goal: Pain level will decrease  Description: Pain level will decrease  6/22/2020 0919 by Ceferino Morales RN  Outcome: Ongoing  6/21/2020 1938 by Arpita Nunez RN  Outcome: Ongoing  Goal: Control of acute pain  Description: Control of acute pain  6/22/2020 0919 by Ceferino Morales RN  Outcome: Ongoing  6/21/2020 1938 by Arpita Nunez RN  Outcome: Ongoing  Goal: Control of chronic pain  Description: Control of chronic pain  6/22/2020 0919 by Ceferino Morales RN  Outcome: Ongoing  6/21/2020 1938 by Arpita Nunez RN  Outcome: Ongoing     Problem: Skin Integrity - Impaired:  Goal: Will show no infection signs and symptoms  Description: Will show no infection signs and symptoms  6/22/2020 0919 by Ceferino Morales RN  Outcome: Ongoing  6/21/2020 1938 by Arpita Nunez RN  Outcome: Ongoing     Problem: Pain:  Goal: Pain level will decrease  Description: Pain level will decrease  6/22/2020 0919 by Ceferino Morales RN  Outcome: Ongoing  6/21/2020 1938 by Arpita Nunez RN  Outcome: Ongoing  Goal: Control of acute pain  Description: Control of acute pain  6/22/2020 0919 by Ceferino Morales RN  Outcome:

## 2020-06-23 LAB
ABSOLUTE EOS #: 0.3 K/UL (ref 0–0.44)
ABSOLUTE IMMATURE GRANULOCYTE: <0.03 K/UL (ref 0–0.3)
ABSOLUTE LYMPH #: 1.37 K/UL (ref 1.1–3.7)
ABSOLUTE MONO #: 0.7 K/UL (ref 0.1–1.2)
ANION GAP SERPL CALCULATED.3IONS-SCNC: 10 MMOL/L (ref 9–17)
BASOPHILS # BLD: 1 % (ref 0–2)
BASOPHILS ABSOLUTE: 0.03 K/UL (ref 0–0.2)
BUN BLDV-MCNC: 15 MG/DL (ref 8–23)
BUN/CREAT BLD: 17 (ref 9–20)
CALCIUM SERPL-MCNC: 9.1 MG/DL (ref 8.6–10.4)
CHLORIDE BLD-SCNC: 100 MMOL/L (ref 98–107)
CO2: 27 MMOL/L (ref 20–31)
CREAT SERPL-MCNC: 0.86 MG/DL (ref 0.7–1.2)
DIFFERENTIAL TYPE: ABNORMAL
EOSINOPHILS RELATIVE PERCENT: 5 % (ref 1–4)
GFR AFRICAN AMERICAN: >60 ML/MIN
GFR NON-AFRICAN AMERICAN: >60 ML/MIN
GFR SERPL CREATININE-BSD FRML MDRD: ABNORMAL ML/MIN/{1.73_M2}
GFR SERPL CREATININE-BSD FRML MDRD: ABNORMAL ML/MIN/{1.73_M2}
GLUCOSE BLD-MCNC: 124 MG/DL (ref 70–99)
HCT VFR BLD CALC: 33.8 % (ref 40.7–50.3)
HEMOGLOBIN: 11.3 G/DL (ref 13–17)
IMMATURE GRANULOCYTES: 0 %
INR BLD: 2
LYMPHOCYTES # BLD: 23 % (ref 24–43)
MCH RBC QN AUTO: 37 PG (ref 25.2–33.5)
MCHC RBC AUTO-ENTMCNC: 33.4 G/DL (ref 25.2–33.5)
MCV RBC AUTO: 110.8 FL (ref 82.6–102.9)
MONOCYTES # BLD: 12 % (ref 3–12)
NRBC AUTOMATED: 0 PER 100 WBC
PDW BLD-RTO: 14.9 % (ref 11.8–14.4)
PLATELET # BLD: 295 K/UL (ref 138–453)
PLATELET ESTIMATE: ABNORMAL
PMV BLD AUTO: 9.1 FL (ref 8.1–13.5)
POTASSIUM SERPL-SCNC: 4.4 MMOL/L (ref 3.7–5.3)
PROTHROMBIN TIME: 21.8 SEC (ref 11.5–14.2)
RBC # BLD: 3.05 M/UL (ref 4.21–5.77)
RBC # BLD: ABNORMAL 10*6/UL
SEG NEUTROPHILS: 59 % (ref 36–65)
SEGMENTED NEUTROPHILS ABSOLUTE COUNT: 3.61 K/UL (ref 1.5–8.1)
SODIUM BLD-SCNC: 137 MMOL/L (ref 135–144)
VANCOMYCIN TROUGH DATE LAST DOSE: ABNORMAL
VANCOMYCIN TROUGH DOSE AMOUNT: ABNORMAL
VANCOMYCIN TROUGH TIME LAST DOSE: ABNORMAL
VANCOMYCIN TROUGH: 9.2 UG/ML (ref 10–20)
WBC # BLD: 6 K/UL (ref 3.5–11.3)
WBC # BLD: ABNORMAL 10*3/UL

## 2020-06-23 PROCEDURE — 94760 N-INVAS EAR/PLS OXIMETRY 1: CPT

## 2020-06-23 PROCEDURE — 36415 COLL VENOUS BLD VENIPUNCTURE: CPT

## 2020-06-23 PROCEDURE — 99221 1ST HOSP IP/OBS SF/LOW 40: CPT | Performed by: ORTHOPAEDIC SURGERY

## 2020-06-23 PROCEDURE — 6370000000 HC RX 637 (ALT 250 FOR IP): Performed by: NURSE PRACTITIONER

## 2020-06-23 PROCEDURE — 80048 BASIC METABOLIC PNL TOTAL CA: CPT

## 2020-06-23 PROCEDURE — 2580000003 HC RX 258: Performed by: FAMILY MEDICINE

## 2020-06-23 PROCEDURE — 85025 COMPLETE CBC W/AUTO DIFF WBC: CPT

## 2020-06-23 PROCEDURE — 2580000003 HC RX 258: Performed by: EMERGENCY MEDICINE

## 2020-06-23 PROCEDURE — 85610 PROTHROMBIN TIME: CPT

## 2020-06-23 PROCEDURE — 51798 US URINE CAPACITY MEASURE: CPT

## 2020-06-23 PROCEDURE — 80202 ASSAY OF VANCOMYCIN: CPT

## 2020-06-23 PROCEDURE — 2580000003 HC RX 258: Performed by: NURSE PRACTITIONER

## 2020-06-23 PROCEDURE — 6360000002 HC RX W HCPCS: Performed by: EMERGENCY MEDICINE

## 2020-06-23 PROCEDURE — 99232 SBSQ HOSP IP/OBS MODERATE 35: CPT | Performed by: FAMILY MEDICINE

## 2020-06-23 PROCEDURE — 6360000002 HC RX W HCPCS: Performed by: NURSE PRACTITIONER

## 2020-06-23 PROCEDURE — 2060000000 HC ICU INTERMEDIATE R&B

## 2020-06-23 PROCEDURE — 6370000000 HC RX 637 (ALT 250 FOR IP): Performed by: FAMILY MEDICINE

## 2020-06-23 RX ADMIN — GABAPENTIN 600 MG: 600 TABLET, FILM COATED ORAL at 07:43

## 2020-06-23 RX ADMIN — WARFARIN SODIUM 6 MG: 5 TABLET ORAL at 17:09

## 2020-06-23 RX ADMIN — HYDROCODONE BITARTRATE AND ACETAMINOPHEN 1 TABLET: 5; 325 TABLET ORAL at 02:51

## 2020-06-23 RX ADMIN — POLYVINYL ALCOHOL 1 DROP: 14 SOLUTION/ DROPS OPHTHALMIC at 21:07

## 2020-06-23 RX ADMIN — THEOPHYLLINE 300 MG: 300 TABLET, EXTENDED RELEASE ORAL at 07:45

## 2020-06-23 RX ADMIN — BETHANECHOL CHLORIDE 25 MG: 25 TABLET ORAL at 21:05

## 2020-06-23 RX ADMIN — HYDROCODONE BITARTRATE AND ACETAMINOPHEN 1 TABLET: 5; 325 TABLET ORAL at 21:04

## 2020-06-23 RX ADMIN — GABAPENTIN 600 MG: 600 TABLET, FILM COATED ORAL at 14:53

## 2020-06-23 RX ADMIN — HYDROXYUREA 1000 MG: 500 CAPSULE ORAL at 07:44

## 2020-06-23 RX ADMIN — Medication 10 ML: at 07:50

## 2020-06-23 RX ADMIN — HYDROCODONE BITARTRATE AND ACETAMINOPHEN 1 TABLET: 5; 325 TABLET ORAL at 11:42

## 2020-06-23 RX ADMIN — Medication 10 ML: at 21:06

## 2020-06-23 RX ADMIN — CYANOCOBALAMIN TAB 1000 MCG 1500 MCG: 1000 TAB at 07:43

## 2020-06-23 RX ADMIN — PANTOPRAZOLE SODIUM 40 MG: 40 TABLET, DELAYED RELEASE ORAL at 05:22

## 2020-06-23 RX ADMIN — VANCOMYCIN HYDROCHLORIDE 1250 MG: 500 INJECTION, POWDER, LYOPHILIZED, FOR SOLUTION INTRAVENOUS at 17:09

## 2020-06-23 RX ADMIN — POLYVINYL ALCOHOL 1 DROP: 14 SOLUTION/ DROPS OPHTHALMIC at 07:50

## 2020-06-23 RX ADMIN — Medication 10 ML: at 05:06

## 2020-06-23 RX ADMIN — GABAPENTIN 600 MG: 600 TABLET, FILM COATED ORAL at 21:05

## 2020-06-23 RX ADMIN — BETHANECHOL CHLORIDE 25 MG: 25 TABLET ORAL at 14:53

## 2020-06-23 RX ADMIN — POLYVINYL ALCOHOL 1 DROP: 14 SOLUTION/ DROPS OPHTHALMIC at 14:54

## 2020-06-23 RX ADMIN — THERA TABS 1 TABLET: TAB at 07:43

## 2020-06-23 RX ADMIN — VANCOMYCIN HYDROCHLORIDE 1250 MG: 500 INJECTION, POWDER, LYOPHILIZED, FOR SOLUTION INTRAVENOUS at 05:05

## 2020-06-23 RX ADMIN — PIPERACILLIN AND TAZOBACTAM 3.38 G: 3; .375 INJECTION, POWDER, LYOPHILIZED, FOR SOLUTION INTRAVENOUS at 07:42

## 2020-06-23 RX ADMIN — THEOPHYLLINE 300 MG: 300 TABLET, EXTENDED RELEASE ORAL at 21:05

## 2020-06-23 RX ADMIN — ROSUVASTATIN 40 MG: 20 TABLET, FILM COATED ORAL at 07:43

## 2020-06-23 RX ADMIN — VERAPAMIL HYDROCHLORIDE 180 MG: 180 TABLET, FILM COATED, EXTENDED RELEASE ORAL at 07:43

## 2020-06-23 RX ADMIN — BETHANECHOL CHLORIDE 25 MG: 25 TABLET ORAL at 07:43

## 2020-06-23 ASSESSMENT — PAIN DESCRIPTION - PAIN TYPE
TYPE: ACUTE PAIN

## 2020-06-23 ASSESSMENT — PAIN DESCRIPTION - ONSET
ONSET: GRADUAL
ONSET: GRADUAL

## 2020-06-23 ASSESSMENT — PAIN SCALES - GENERAL
PAINLEVEL_OUTOF10: 5
PAINLEVEL_OUTOF10: 3
PAINLEVEL_OUTOF10: 2
PAINLEVEL_OUTOF10: 0
PAINLEVEL_OUTOF10: 0
PAINLEVEL_OUTOF10: 5
PAINLEVEL_OUTOF10: 6
PAINLEVEL_OUTOF10: 3

## 2020-06-23 ASSESSMENT — PAIN DESCRIPTION - LOCATION
LOCATION: LEG

## 2020-06-23 ASSESSMENT — PAIN DESCRIPTION - DESCRIPTORS
DESCRIPTORS: ACHING
DESCRIPTORS: ACHING
DESCRIPTORS: TENDER

## 2020-06-23 ASSESSMENT — PAIN DESCRIPTION - ORIENTATION
ORIENTATION: LEFT;LOWER

## 2020-06-23 ASSESSMENT — PAIN DESCRIPTION - FREQUENCY
FREQUENCY: CONTINUOUS
FREQUENCY: INTERMITTENT

## 2020-06-23 NOTE — PROGRESS NOTES
Pharmacy Vancomycin Consult     Vancomycin Day: 3  Current Dosin mg q12h    Temp max:  98.4    Recent Labs     20  0525 20  0449   CREATININE 0.89 0.86     Estimated Creatinine Clearance: 81 mL/min (based on SCr of 0.86 mg/dL). Recent Labs     20  0525 20  0449   WBC 5.6 6.0       Culture Date      Source                Results  20              Blood                  NGTD    Trough: 9.2    Assessment/Plan: Scr remains stable. Afebrile. WBC are WNL. Vancomycin trough was just slightly below desired range. I will not change dose at this time as patient is clinically improving and anticipate vancomycin level to continue to increase into desired range.      Gertrudis Nolan  2020  11:45 AM

## 2020-06-23 NOTE — PLAN OF CARE
RN  Outcome: Ongoing  6/22/2020 1925 by Gertrude Crowder RN  Outcome: Ongoing  Goal: Control of chronic pain  Description: Control of chronic pain  6/23/2020 0910 by Christian Perez RN  Outcome: Ongoing  6/22/2020 1925 by Gertrude Crowder RN  Outcome: Ongoing     Problem: Falls - Risk of:  Goal: Will remain free from falls  Description: Will remain free from falls  6/23/2020 0910 by Christian Perez RN  Outcome: Ongoing  6/22/2020 1925 by Gertrude Crowder RN  Outcome: Ongoing  Goal: Absence of physical injury  Description: Absence of physical injury  6/23/2020 0910 by Christian Perez RN  Outcome: Ongoing  6/22/2020 1925 by Gertrude Crowder RN  Outcome: Ongoing     Problem: Safety:  Goal: Free from accidental physical injury  Description: Free from accidental physical injury  6/23/2020 0910 by Christian Perez RN  Outcome: Ongoing  6/22/2020 1925 by Gertrude Crowder RN  Outcome: Ongoing  Goal: Free from intentional harm  Description: Free from intentional harm  6/23/2020 0910 by Christian Perez RN  Outcome: Ongoing  6/22/2020 1925 by Gertrude Crowder RN  Outcome: Ongoing     Problem: Daily Care:  Goal: Daily care needs are met  Description: Daily care needs are met  6/23/2020 0910 by Christian Perez RN  Outcome: Ongoing  6/22/2020 1925 by Gertrude Crowder RN  Outcome: Ongoing

## 2020-06-23 NOTE — PROGRESS NOTES
(87.1 kg)   SpO2 92%   BMI 27.55 kg/m²   General appearance: alert and cooperative with exam  HEENT: Eye: Normal external eye, conjunctiva, lids cornea, JANETH. Neck: no adenopathy, no carotid bruit, no JVD, supple, symmetrical, trachea midline and thyroid not enlarged, symmetric, no tenderness/mass/nodules  Lungs: clear to auscultation bilaterally  Heart: regular rate and rhythm, S1, S2 normal, no murmur, click, rub or gallop  Abdomen: soft, non-tender; bowel sounds normal; no masses,  no organomegaly  Extremities: Swelling in lower leg with superficail skin breakdown ,surroundinfg redness is receding from line of demarkation. Neurologic: Mental status: Alert, oriented, thought content appropriate    Assessment and Plan:   1.  Cellulitis with hematoma left leg    Patient continues to require in patient admission for IV antibiotics,will stop zosyn nad continue vancomycin ,ortho consult to see if he needs any drainage    Patient Active Problem List:     Abnormal ECG     RBBB     LEDESMA (dyspnea on exertion)     Chronic obstructive pulmonary disease (HCC)     Cellulitis     Hyperlipidemia     Hypertension     Thrombocytosis (HCC)     Peripheral vascular disease (HCC)     Peripheral neuropathy     Depression     Rosacea     Urinary retention      Roland Sever, MD  [facility] Hospitalist

## 2020-06-24 VITALS
WEIGHT: 190.6 LBS | HEIGHT: 70 IN | HEART RATE: 61 BPM | TEMPERATURE: 97.8 F | BODY MASS INDEX: 27.29 KG/M2 | OXYGEN SATURATION: 94 % | SYSTOLIC BLOOD PRESSURE: 139 MMHG | RESPIRATION RATE: 16 BRPM | DIASTOLIC BLOOD PRESSURE: 78 MMHG

## 2020-06-24 LAB
ABSOLUTE EOS #: 0.21 K/UL (ref 0–0.44)
ABSOLUTE IMMATURE GRANULOCYTE: <0.03 K/UL (ref 0–0.3)
ABSOLUTE LYMPH #: 1.38 K/UL (ref 1.1–3.7)
ABSOLUTE MONO #: 0.58 K/UL (ref 0.1–1.2)
ANION GAP SERPL CALCULATED.3IONS-SCNC: 10 MMOL/L (ref 9–17)
BASOPHILS # BLD: 1 % (ref 0–2)
BASOPHILS ABSOLUTE: 0.04 K/UL (ref 0–0.2)
BUN BLDV-MCNC: 12 MG/DL (ref 8–23)
BUN/CREAT BLD: 16 (ref 9–20)
CALCIUM SERPL-MCNC: 9.4 MG/DL (ref 8.6–10.4)
CHLORIDE BLD-SCNC: 103 MMOL/L (ref 98–107)
CO2: 27 MMOL/L (ref 20–31)
CREAT SERPL-MCNC: 0.75 MG/DL (ref 0.7–1.2)
DIFFERENTIAL TYPE: ABNORMAL
EOSINOPHILS RELATIVE PERCENT: 4 % (ref 1–4)
GFR AFRICAN AMERICAN: >60 ML/MIN
GFR NON-AFRICAN AMERICAN: >60 ML/MIN
GFR SERPL CREATININE-BSD FRML MDRD: ABNORMAL ML/MIN/{1.73_M2}
GFR SERPL CREATININE-BSD FRML MDRD: ABNORMAL ML/MIN/{1.73_M2}
GLUCOSE BLD-MCNC: 103 MG/DL (ref 70–99)
HCT VFR BLD CALC: 34.2 % (ref 40.7–50.3)
HEMOGLOBIN: 11.4 G/DL (ref 13–17)
IMMATURE GRANULOCYTES: 0 %
INR BLD: 1.5
LYMPHOCYTES # BLD: 27 % (ref 24–43)
MCH RBC QN AUTO: 36.7 PG (ref 25.2–33.5)
MCHC RBC AUTO-ENTMCNC: 33.3 G/DL (ref 25.2–33.5)
MCV RBC AUTO: 110 FL (ref 82.6–102.9)
MONOCYTES # BLD: 11 % (ref 3–12)
NRBC AUTOMATED: 0 PER 100 WBC
PDW BLD-RTO: 14.5 % (ref 11.8–14.4)
PLATELET # BLD: 301 K/UL (ref 138–453)
PLATELET ESTIMATE: ABNORMAL
PMV BLD AUTO: 9.2 FL (ref 8.1–13.5)
POTASSIUM SERPL-SCNC: 4.2 MMOL/L (ref 3.7–5.3)
PROTHROMBIN TIME: 18 SEC (ref 11.5–14.2)
RBC # BLD: 3.11 M/UL (ref 4.21–5.77)
RBC # BLD: ABNORMAL 10*6/UL
SEG NEUTROPHILS: 57 % (ref 36–65)
SEGMENTED NEUTROPHILS ABSOLUTE COUNT: 2.93 K/UL (ref 1.5–8.1)
SODIUM BLD-SCNC: 140 MMOL/L (ref 135–144)
WBC # BLD: 5.2 K/UL (ref 3.5–11.3)
WBC # BLD: ABNORMAL 10*3/UL

## 2020-06-24 PROCEDURE — 2580000003 HC RX 258: Performed by: FAMILY MEDICINE

## 2020-06-24 PROCEDURE — 94760 N-INVAS EAR/PLS OXIMETRY 1: CPT

## 2020-06-24 PROCEDURE — 97161 PT EVAL LOW COMPLEX 20 MIN: CPT | Performed by: PHYSICAL THERAPIST

## 2020-06-24 PROCEDURE — 85025 COMPLETE CBC W/AUTO DIFF WBC: CPT

## 2020-06-24 PROCEDURE — 6370000000 HC RX 637 (ALT 250 FOR IP): Performed by: NURSE PRACTITIONER

## 2020-06-24 PROCEDURE — 36415 COLL VENOUS BLD VENIPUNCTURE: CPT

## 2020-06-24 PROCEDURE — 99238 HOSP IP/OBS DSCHRG MGMT 30/<: CPT | Performed by: INTERNAL MEDICINE

## 2020-06-24 PROCEDURE — 80048 BASIC METABOLIC PNL TOTAL CA: CPT

## 2020-06-24 PROCEDURE — 6360000002 HC RX W HCPCS: Performed by: EMERGENCY MEDICINE

## 2020-06-24 PROCEDURE — 2580000003 HC RX 258: Performed by: EMERGENCY MEDICINE

## 2020-06-24 PROCEDURE — 85610 PROTHROMBIN TIME: CPT

## 2020-06-24 PROCEDURE — 6370000000 HC RX 637 (ALT 250 FOR IP): Performed by: FAMILY MEDICINE

## 2020-06-24 RX ORDER — LOVASTATIN 10 MG/1
5 TABLET ORAL DAILY
Qty: 30 TABLET | Refills: 0
Start: 2020-06-24

## 2020-06-24 RX ORDER — HYDROCODONE BITARTRATE AND ACETAMINOPHEN 5; 325 MG/1; MG/1
1 TABLET ORAL EVERY 6 HOURS PRN
Qty: 10 TABLET | Refills: 0 | Status: SHIPPED | OUTPATIENT
Start: 2020-06-24 | End: 2020-06-27

## 2020-06-24 RX ORDER — BETHANECHOL CHLORIDE 25 MG/1
25 TABLET ORAL 3 TIMES DAILY
Qty: 90 TABLET | Refills: 0 | Status: SHIPPED | OUTPATIENT
Start: 2020-06-24 | End: 2020-07-13

## 2020-06-24 RX ORDER — DOXYCYCLINE HYCLATE 100 MG
100 TABLET ORAL 2 TIMES DAILY
Qty: 14 TABLET | Refills: 0 | Status: SHIPPED | OUTPATIENT
Start: 2020-06-24 | End: 2020-07-01

## 2020-06-24 RX ORDER — SULFAMETHOXAZOLE AND TRIMETHOPRIM 800; 160 MG/1; MG/1
TABLET ORAL
Qty: 4 TABLET | Refills: 0 | Status: SHIPPED | OUTPATIENT
Start: 2020-06-24 | End: 2020-07-07

## 2020-06-24 RX ORDER — GREEN TEA/HOODIA GORDONII 315-12.5MG
1 CAPSULE ORAL 3 TIMES DAILY
Qty: 30 TABLET | Refills: 0 | COMMUNITY
Start: 2020-06-24 | End: 2020-07-04

## 2020-06-24 RX ADMIN — PANTOPRAZOLE SODIUM 40 MG: 40 TABLET, DELAYED RELEASE ORAL at 05:10

## 2020-06-24 RX ADMIN — POLYVINYL ALCOHOL 1 DROP: 14 SOLUTION/ DROPS OPHTHALMIC at 09:41

## 2020-06-24 RX ADMIN — THEOPHYLLINE 300 MG: 300 TABLET, EXTENDED RELEASE ORAL at 09:37

## 2020-06-24 RX ADMIN — HYDROCODONE BITARTRATE AND ACETAMINOPHEN 1 TABLET: 5; 325 TABLET ORAL at 08:08

## 2020-06-24 RX ADMIN — THERA TABS 1 TABLET: TAB at 09:38

## 2020-06-24 RX ADMIN — HYDROCODONE BITARTRATE AND ACETAMINOPHEN 1 TABLET: 5; 325 TABLET ORAL at 01:01

## 2020-06-24 RX ADMIN — BETHANECHOL CHLORIDE 25 MG: 25 TABLET ORAL at 09:38

## 2020-06-24 RX ADMIN — HYDROCODONE BITARTRATE AND ACETAMINOPHEN 1 TABLET: 5; 325 TABLET ORAL at 14:12

## 2020-06-24 RX ADMIN — GABAPENTIN 600 MG: 600 TABLET, FILM COATED ORAL at 14:12

## 2020-06-24 RX ADMIN — VERAPAMIL HYDROCHLORIDE 180 MG: 180 TABLET, FILM COATED, EXTENDED RELEASE ORAL at 09:38

## 2020-06-24 RX ADMIN — POLYVINYL ALCOHOL 1 DROP: 14 SOLUTION/ DROPS OPHTHALMIC at 14:11

## 2020-06-24 RX ADMIN — Medication 10 ML: at 09:41

## 2020-06-24 RX ADMIN — VANCOMYCIN HYDROCHLORIDE 1250 MG: 500 INJECTION, POWDER, LYOPHILIZED, FOR SOLUTION INTRAVENOUS at 05:10

## 2020-06-24 RX ADMIN — BETHANECHOL CHLORIDE 25 MG: 25 TABLET ORAL at 14:12

## 2020-06-24 RX ADMIN — GABAPENTIN 600 MG: 600 TABLET, FILM COATED ORAL at 09:38

## 2020-06-24 RX ADMIN — CYANOCOBALAMIN TAB 1000 MCG 1500 MCG: 1000 TAB at 09:45

## 2020-06-24 RX ADMIN — ROSUVASTATIN 40 MG: 20 TABLET, FILM COATED ORAL at 09:38

## 2020-06-24 RX ADMIN — HYDROXYUREA 1000 MG: 500 CAPSULE ORAL at 09:38

## 2020-06-24 ASSESSMENT — PAIN SCALES - GENERAL
PAINLEVEL_OUTOF10: 3
PAINLEVEL_OUTOF10: 4
PAINLEVEL_OUTOF10: 4
PAINLEVEL_OUTOF10: 5
PAINLEVEL_OUTOF10: 5
PAINLEVEL_OUTOF10: 9
PAINLEVEL_OUTOF10: 3

## 2020-06-24 ASSESSMENT — PAIN DESCRIPTION - LOCATION
LOCATION: LEG
LOCATION: LEG

## 2020-06-24 ASSESSMENT — PAIN DESCRIPTION - DESCRIPTORS: DESCRIPTORS: ACHING;OTHER (COMMENT)

## 2020-06-24 ASSESSMENT — PAIN DESCRIPTION - PAIN TYPE
TYPE: ACUTE PAIN
TYPE: ACUTE PAIN

## 2020-06-24 ASSESSMENT — PAIN DESCRIPTION - FREQUENCY: FREQUENCY: CONTINUOUS

## 2020-06-24 ASSESSMENT — PAIN DESCRIPTION - ORIENTATION
ORIENTATION: LEFT
ORIENTATION: LEFT;LOWER

## 2020-06-24 ASSESSMENT — PAIN DESCRIPTION - ONSET: ONSET: GRADUAL

## 2020-06-24 NOTE — PROGRESS NOTES
SW completed a Psychosocial Assessment for evaluation of patient's mental health, social status, and functional capacity within the community. Patient is a 68year old  male admitted due to Cellulitis. Patient was alert, cooperative, and engaging during assessment. Patient states he lives in [address] [place] [address] with his wife. Patient reports he is a [employer]  and connected to [employer]. Patient states he has support form immediate family, extended family, community, and friends. Patient states he uses a geiger as his durable medical equipment. Patient reports he does not use outside community services. SW provided education and resources. SW provided supportive listening while patient discussed his time in the service and positive family support. Patient has PCP DALJIT Gifford and reports no issues affording his medication. Patient states he wishes to be discharged home with no additional services at this time. Patient reports he will be discharged home today. Patient is a full code status at this time and does not have a advance directive. Patient reports he needs no further assistance at this time. SW provided business card. SW will continue to monitor needs and assist as appropriate.        ALICIA Bae  6/24/2020

## 2020-06-24 NOTE — PROGRESS NOTES
Pharmacy Vancomycin Consult    Vancomycin Day: 4  Current Dosin mg q12h    Temp max:  98.1    Recent Labs     20  0449 20  0500   CREATININE 0.86 0.75     Estimated Creatinine Clearance: 93 mL/min (based on SCr of 0.75 mg/dL). Recent Labs     20  0449 20  0500   WBC 6.0 5.2       Culture Date       Source                   Results  20               Blood                     NGTD    Level ordered for:      Assessment/Plan:  Scr is stable/declining. WBC are WNL. Afebrile. UO is good past 24 hrs. Continue same dose. Continue to monitor.      Gertrudis Noble  2020  11:58 AM

## 2020-06-24 NOTE — PLAN OF CARE
Problem: Discharge Planning:  Goal: Discharged to appropriate level of care  Description: Discharged to appropriate level of care  6/24/2020 1125 by Jorge Flores RN  Outcome: Ongoing  6/24/2020 0637 by Florecita Roca RN  Outcome: Ongoing  6/24/2020 0127 by Bernardo Matta RN  Outcome: Ongoing     Problem:  Body Temperature - Imbalanced:  Goal: Ability to maintain a body temperature in the normal range will improve  Description: Ability to maintain a body temperature in the normal range will improve  6/24/2020 1125 by Jorge Flores RN  Outcome: Ongoing  6/24/2020 0637 by Florecita Roca RN  Outcome: Ongoing  6/24/2020 0127 by Bernardo Matta RN  Outcome: Ongoing     Problem: Mobility - Impaired:  Goal: Mobility will improve to maximum level  Description: Mobility will improve to maximum level  6/24/2020 1125 by Jorge Flores RN  Outcome: Ongoing  6/24/2020 0637 by Florecita Roca RN  Outcome: Ongoing  6/24/2020 0127 by Bernardo Matta RN  Outcome: Ongoing     Problem: Pain:  Goal: Pain level will decrease  Description: Pain level will decrease  6/24/2020 1125 by Jorge Flores RN  Outcome: Ongoing  6/24/2020 0637 by Florecita Roca RN  Outcome: Ongoing  6/24/2020 0127 by Bernardo Matta RN  Outcome: Ongoing  Goal: Control of acute pain  Description: Control of acute pain  6/24/2020 1125 by Jorge Flores RN  Outcome: Ongoing  6/24/2020 0637 by Florecita Roca RN  Outcome: Ongoing  6/24/2020 0127 by Bernardo Matta RN  Outcome: Ongoing  Goal: Control of chronic pain  Description: Control of chronic pain  6/24/2020 1125 by Jorge Flores RN  Outcome: Ongoing  6/24/2020 0637 by Florecita Roca RN  Outcome: Ongoing  6/24/2020 0127 by Bernardo Matta RN  Outcome: Ongoing     Problem: Skin Integrity - Impaired:  Goal: Will show no infection signs and symptoms  Description: Will show no infection signs and symptoms  6/24/2020 1125 by Jorge Flores RN  Outcome: Ongoing  6/24/2020 0637 by Devorah Quiroz Tameka Diaz RN  Outcome: Ongoing  6/24/2020 0127 by Ewing Ahumada, RN  Outcome: Ongoing     Problem: Pain:  Goal: Pain level will decrease  Description: Pain level will decrease  6/24/2020 1125 by Elpidio Bernstein RN  Outcome: Ongoing  6/24/2020 0637 by Royal Sammy RN  Outcome: Ongoing  6/24/2020 0127 by Ewing Ahumada, RN  Outcome: Ongoing  Goal: Control of acute pain  Description: Control of acute pain  6/24/2020 1125 by Elpidio Bernstein RN  Outcome: Ongoing  6/24/2020 0637 by Royal Sammy RN  Outcome: Ongoing  6/24/2020 0127 by Ewing Ahumada, RN  Outcome: Ongoing  Goal: Control of chronic pain  Description: Control of chronic pain  6/24/2020 1125 by Elpidio Bernstein RN  Outcome: Ongoing  6/24/2020 0637 by Royal Sammy RN  Outcome: Ongoing  6/24/2020 0127 by Ewing Ahumada, RN  Outcome: Ongoing     Problem: Falls - Risk of:  Goal: Will remain free from falls  Description: Will remain free from falls  6/24/2020 1125 by Elpidio Bernstein RN  Outcome: Ongoing  6/24/2020 0637 by Royal Sammy RN  Outcome: Ongoing  6/24/2020 0127 by Ewing Ahumada, RN  Outcome: Ongoing  Goal: Absence of physical injury  Description: Absence of physical injury  6/24/2020 1125 by Elpidio Bernstein RN  Outcome: Ongoing  6/24/2020 0637 by Royal Sammy RN  Outcome: Ongoing  6/24/2020 0127 by Ewing Ahumada, RN  Outcome: Ongoing     Problem: Safety:  Goal: Free from accidental physical injury  Description: Free from accidental physical injury  6/24/2020 1125 by Elpidio Bernstein RN  Outcome: Ongoing  6/24/2020 0637 by Royal Sammy RN  Outcome: Ongoing  6/24/2020 0127 by Ewing Ahumada, RN  Outcome: Ongoing  Goal: Free from intentional harm  Description: Free from intentional harm  6/24/2020 1125 by Elpidio Bernstein RN  Outcome: Ongoing  6/24/2020 0637 by Royal Sammy RN  Outcome: Ongoing  6/24/2020 0127 by Ewing Ahumada, RN  Outcome: Ongoing     Problem: Daily Care:  Goal: Daily care needs are met  Description: Daily

## 2020-06-24 NOTE — PLAN OF CARE
Problem: Discharge Planning:  Goal: Discharged to appropriate level of care  Description: Discharged to appropriate level of care  Outcome: Ongoing     Problem:  Body Temperature - Imbalanced:  Goal: Ability to maintain a body temperature in the normal range will improve  Description: Ability to maintain a body temperature in the normal range will improve  Outcome: Ongoing     Problem: Mobility - Impaired:  Goal: Mobility will improve to maximum level  Description: Mobility will improve to maximum level  Outcome: Ongoing     Problem: Pain:  Goal: Pain level will decrease  Description: Pain level will decrease  Outcome: Ongoing  Goal: Control of acute pain  Description: Control of acute pain  Outcome: Ongoing  Goal: Control of chronic pain  Description: Control of chronic pain  Outcome: Ongoing     Problem: Skin Integrity - Impaired:  Goal: Will show no infection signs and symptoms  Description: Will show no infection signs and symptoms  Outcome: Ongoing     Problem: Pain:  Goal: Pain level will decrease  Description: Pain level will decrease  Outcome: Ongoing  Goal: Control of acute pain  Description: Control of acute pain  Outcome: Ongoing  Goal: Control of chronic pain  Description: Control of chronic pain  Outcome: Ongoing     Problem: Falls - Risk of:  Goal: Will remain free from falls  Description: Will remain free from falls  Outcome: Ongoing  Goal: Absence of physical injury  Description: Absence of physical injury  Outcome: Ongoing     Problem: Safety:  Goal: Free from accidental physical injury  Description: Free from accidental physical injury  Outcome: Ongoing  Goal: Free from intentional harm  Description: Free from intentional harm  Outcome: Ongoing     Problem: Daily Care:  Goal: Daily care needs are met  Description: Daily care needs are met  Outcome: Ongoing

## 2020-06-24 NOTE — PROGRESS NOTES
Hospitalist Progress Note    Patient:  Kennedy Blue.      YOB: 1950    MRN: [id number]   Admit date: 6/21/2020     Acct: [de-identified]     PCP: Harper Marques, AIMEE - CNP    CC--Interval History:   LLE cellulitis---dropped an I-beam on the shin left leg---has been on Vancomycin IV ----wound improved with good recession from the marked margins----converting to Bactrim DS and doxycycline for home--has been seen by Orthopedics--no surgical intervention----home----6.24.2020    COPD--stable     Tobacco abuse----quit---2018    HTN---/70    CONLEY---urinary retention--seen by Urology---on urecholine---able to void     See below     All other ROS negative except noted in HPI    Diet:  DIET CARDIAC;    Medications:  Scheduled Meds:   warfarin  6 mg Oral Once    bethanechol  25 mg Oral TID    theophylline  300 mg Oral BID    vancomycin (VANCOCIN) intermittent dosing (placeholder)   Other RX Placeholder    vancomycin  1,250 mg Intravenous Q12H    budesonide-formoterol  2 puff Inhalation BID    vitamin B-12  1,500 mcg Oral Daily    gabapentin  600 mg Oral TID    hydroxyurea  1,000 mg Oral Daily    polyvinyl alcohol  1 drop Both Eyes TID    multivitamin  1 tablet Oral Daily    pantoprazole  40 mg Oral QAM AC    rosuvastatin  40 mg Oral QPM    verapamil  180 mg Oral Daily    sodium chloride flush  10 mL Intravenous 2 times per day    warfarin (COUMADIN) daily dosing (placeholder)   Other RX Placeholder     Continuous Infusions:  PRN Meds:HYDROcodone 5 mg - acetaminophen **OR** HYDROcodone 5 mg - acetaminophen, sodium chloride nebulizer, albuterol, sodium chloride flush, acetaminophen **OR** [DISCONTINUED] acetaminophen, polyethylene glycol, promethazine **OR** ondansetron    Objective:  Labs:  CBC with Differential:    Lab Results   Component Value Date    WBC 5.2 06/24/2020    RBC 3.11 06/24/2020    HGB 11.4 06/24/2020    HCT 34.2 06/24/2020     06/24/2020    .0 06/24/2020    [facility] reviewed  3. DC Vancomycin ----> Bactrim DS and doxycycline  4. CONLEY--urinary retention---urecholine and Orthopedics outpatient  5. Follow up Matthias Demarco NP--[facility]----[place]  6. Follow up Dr. Armand Samano, Orthopedics  7. Follow up Urology  8. Follow up [facility]  9.    See orders     Electronically signed by Fernanda Tierney on 6/24/2020 at 3:48 PM    Hospitalist

## 2020-06-25 NOTE — DISCHARGE SUMMARY
[address]                 [address] [place] [address], [facility]                               DISCHARGE SUMMARY    PATIENT NAME: Siobhan Banegas                      :        1950  MED REC NO:   [id number]                             ROOM:       0203  ACCOUNT NO:   [de-identified]                           ADMIT DATE: 2020  PROVIDER:     Tamanna Qureshi. Jr Dumont MD                  DISCHARGE DATE:  2020    ATTENDING PHYSICIAN OF HOSPITALIZATION:  John Shabazz MD    PERSONAL CARE PROVIDER:  Dipesh Navarrete, Nurse Practitioner, [facility]. The patient seen by Xuan Munoz, [facility], this  hospitalization. The patient seen by Urology. The patient typically a  patient of [address] [place] [address]. DIAGNOSES:  1. Left lower extremity cellulitis, shin, dropped an i-beam on the shin  of left leg, treated with vancomycin. 2.  COPD, stable. 3.  Tobacco abuse, quit in 2018. 4.  Hypertension. 5.  Bladder outlet obstruction, urinary retention, seen by Urology, on  Urecholine, able to void at discharge. Other medical problems set forth in the progress note and record of  2020. HISTORY OF PRESENT ILLNESS AND HOSPITAL COURSE:  The patient is a  57-year-old white male, patient of [facility], and also  [facility], the patient having been in the [employer]. The patient dropped an i-beam on the left lower extremity,  striking the anterior surface, ended up with an abrasion together with  cellulitis, failed outpatient. The patient was seen and evaluated and  admitted on 2020. The patient being placed on vancomycin. This  wound has continued to improve. Has been seen by Orthopedics, not  deemed to be a surgical issue at this time, given the patient's  improvement and decreased swelling. The patient able to be discharged  to home on 2020.   The patient's erythema has retreated ([place])  300 mg twice daily; verapamil (Calan SR) 180 mg p.o. daily; vitamin B12  1500 mcg p.o. daily; vitamin C (ascorbic acid) 500 mg p.o. daily;  Coumadin per INR typically 5 mg tablet, 5 mg taken Monday, Wednesday,  Friday and 2.5 mg all other days. SPECIFICALLY DISCONTINUED THIS HOSPITALIZATION:  Cephalexin (Keflex). Follow up with the patient's personal care provider, Yobani Perdue,  Nurse Practitioner, [facility]. Follow up with Dr. Jian Nogueira, Orthopedics. Follow up with Urology outpatient for urinary  retention. Follow up with [facility], [facility] for typical  medical needs. Any aspect of the patient's care not discussed in the chart and/or  dictation will be addressed and treated as an outpatient. The patient's medications have been reviewed including, but not limited  to, pre-hospital, hospital and discharge medications. The patient  and/or the patient's personal representatives were specifically advised  the only medications to be taken are those set forth in the discharge  orders and no other medications should be taken. Any prior medications  not on the discharge orders are specifically discontinued.         Sy Anderson MD    D: 06/24/2020 18:12:37       T: 06/24/2020 18:20:22     [email or web address]  Job#: [id number]     Doc#: [id number]    CC:

## 2020-06-26 ENCOUNTER — HOSPITAL ENCOUNTER (OUTPATIENT)
Dept: LAB | Age: 70
Discharge: HOME | End: 2020-06-26
Payer: MEDICARE

## 2020-06-26 LAB
INR BLD: 1.7
PROTHROMBIN TIME: 19.6 SEC (ref 11.5–14.2)

## 2020-06-26 PROCEDURE — 36415 COLL VENOUS BLD VENIPUNCTURE: CPT

## 2020-06-26 PROCEDURE — 85610 PROTHROMBIN TIME: CPT

## 2020-06-28 LAB
CULTURE: NORMAL
CULTURE: NORMAL
Lab: NORMAL
Lab: NORMAL
SPECIMEN DESCRIPTION: NORMAL
SPECIMEN DESCRIPTION: NORMAL

## 2020-07-01 ENCOUNTER — CARE COORDINATION (OUTPATIENT)
Dept: CARE COORDINATION | Age: 70
End: 2020-07-01

## 2020-07-01 NOTE — CARE COORDINATION
You Patient resolved from the Care Transitions episode on 7/1/2020  Discussed COVID-19 related testing which was not done at this time. Test results were not done. Patient informed of results, if available? N/A    Patient/family has been provided the following resources and education related to COVID-19:                         Signs, symptoms and red flags related to COVID-19            CDC exposure and quarantine guidelines            [facility] exposure contact - [phone]            Contact for their local Department of Health                 Patient currently reports that the following symptoms have improved:  no new/worsening symptoms     No further outreach scheduled with this CTN/ACM. Episode of Care resolved. Patient has this CTN/ACM contact information if future needs arise. Patient was in hospital for 4 days for cellulitis of left leg, doing much better.  Has hospital follow up appointments 7/7/2020 with ortho and 7/8/2020 with PCP

## 2020-07-07 ENCOUNTER — OFFICE VISIT (OUTPATIENT)
Dept: ORTHOPEDIC SURGERY | Age: 70
End: 2020-07-07
Payer: MEDICARE

## 2020-07-07 VITALS
TEMPERATURE: 97.3 F | OXYGEN SATURATION: 94 % | BODY MASS INDEX: 27.06 KG/M2 | HEIGHT: 70 IN | RESPIRATION RATE: 14 BRPM | SYSTOLIC BLOOD PRESSURE: 128 MMHG | HEART RATE: 69 BPM | WEIGHT: 189 LBS | DIASTOLIC BLOOD PRESSURE: 70 MMHG

## 2020-07-07 PROCEDURE — 1036F TOBACCO NON-USER: CPT | Performed by: PHYSICIAN ASSISTANT

## 2020-07-07 PROCEDURE — 3017F COLORECTAL CA SCREEN DOC REV: CPT | Performed by: PHYSICIAN ASSISTANT

## 2020-07-07 PROCEDURE — 1123F ACP DISCUSS/DSCN MKR DOCD: CPT | Performed by: PHYSICIAN ASSISTANT

## 2020-07-07 PROCEDURE — 99214 OFFICE O/P EST MOD 30 MIN: CPT

## 2020-07-07 PROCEDURE — 99212 OFFICE O/P EST SF 10 MIN: CPT | Performed by: PHYSICIAN ASSISTANT

## 2020-07-07 PROCEDURE — G8427 DOCREV CUR MEDS BY ELIG CLIN: HCPCS | Performed by: PHYSICIAN ASSISTANT

## 2020-07-07 PROCEDURE — G8417 CALC BMI ABV UP PARAM F/U: HCPCS | Performed by: PHYSICIAN ASSISTANT

## 2020-07-07 PROCEDURE — 1111F DSCHRG MED/CURRENT MED MERGE: CPT | Performed by: PHYSICIAN ASSISTANT

## 2020-07-07 PROCEDURE — 4040F PNEUMOC VAC/ADMIN/RCVD: CPT | Performed by: PHYSICIAN ASSISTANT

## 2020-07-07 NOTE — PROGRESS NOTES
Orthopaedic Progress Note      CHIEF COMPLAINT: Left leg pain    HISTORY OF PRESENT ILLNESS:       Mr. Logan Bright  is a 68 y.o. male  who presents with history of having I beem strike his leg 3 weeks ago. He was noted to have cellulitis at that time. Was admitted to the hospital for IV antibiotics for several days. He was seen by Dr. Latoya Segundo in the hospital was noted to have a contusion and hematoma to his left lower leg. The patient reports having no drainage from his leg redness is down swelling is down pain is improved motion is improved he is satisfied the way things are progressing at this point. Past Medical History:    Past Medical History:   Diagnosis Date    Abdominal aorta thrombosis (HCC)     Alcohol abuse     Arterial thrombosis (HCC)     of lower extremity    Bipolar 1 disorder (HCC)     COPD (chronic obstructive pulmonary disease) (HCC)     Fracture of ulnar styloid     Headache(784.0)     Tesion    History of tobacco use     Hyperlipidemia     Hypertension     [facility] infarction) [facility]     Neck pain, chronic     Other chest pain 9/10/2018    Posttraumatic stress disorder     with panic disorder and anxiety    Tremor     Vasculitis ([facility]     Venous thrombosis of extremity        Past Surgical History:    Past Surgical History:   Procedure Laterality Date    APPENDECTOMY      CARDIAC CATHETERIZATION  7/10    COLONOSCOPY  2015    ELBOW SURGERY Left 1999    TONSILLECTOMY      VASECTOMY           Current  Medications:  Current Outpatient Medications   Medication Sig Dispense Refill    lovastatin (MEVACOR) 10 MG tablet Take 0.5 tablets by mouth daily 30 tablet 0    bethanechol (URECHOLINE) 25 MG tablet Take 1 tablet by mouth 3 times daily 90 tablet 0    omeprazole (PRILOSEC) 20 MG delayed release capsule Take 20 mg by mouth daily      gabapentin (NEURONTIN) 600 MG tablet Take 1 tablet by mouth 3 times daily.       warfarin (COUMADIN) 5 MG tablet Take 5 mg by mouth Take1 full tablet MWF and 0.5 tablets on the other days      vitamin C (ASCORBIC ACID) 500 MG tablet Take 500 mg by mouth daily      hydroxyurea (HYDREA) 500 MG chemo capsule Take 1,000 mg by mouth daily      metroNIDAZOLE (METROGEL) 0.75 % gel Apply topically 2 times daily. 45 g 3    theophylline (THEODUR) 300 MG extended release tablet Take 1 tablet by mouth 2 times daily      albuterol sulfate HFA (PROAIR HFA) 108 (90 Base) MCG/ACT inhaler Inhale 2 puffs into the lungs every 6 hours as needed for Wheezing or Shortness of Breath 1 Inhaler 3    Cyanocobalamin (VITAMIN B-12) 1000 MCG extended release tablet Take 1,500 mcg by mouth daily      Multiple Vitamins-Minerals (MULTIVITAMIN & MINERAL PO) Take 1 tablet by mouth Indications: When he remembers      sildenafil (VIAGRA) 50 MG tablet Take 1 tablet by mouth as needed for Erectile Dysfunction 30 tablet 3    hypromellose 0.4 % SOLN ophthalmic solution Place 1 drop into both eyes 3 times daily      verapamil (CALAN-SR) 180 MG CR tablet Take 180 mg by mouth daily.  Budesonide-Formoterol Fumarate (SYMBICORT IN) Inhale 2 puffs into the lungs daily       No current facility-administered medications for this visit.         Allergies:  Diclofenac sodium    Social History:   Social History     Tobacco Use   Smoking Status Former Smoker    Packs/day: 0.50    Years: 40.00    Pack years: 20.00    Types: Cigarettes    Last attempt to quit: 10/11/2018    Years since quittin.7   Smokeless Tobacco Never Used   Tobacco Comment    cwaltmire rrt 20     Social History     Substance and Sexual Activity   Alcohol Use Yes    Alcohol/week: 11.7 standard drinks    Types: 14 Standard drinks or equivalent per week     Social History     Substance and Sexual Activity   Drug Use Never       Family History:  Family History   Problem Relation Age of Onset    High Blood Pressure Mother     Macular Degen Mother     High Blood Pressure Sister     Cancer 9:11 am COMPARISON: None. HISTORY: ORDERING SYSTEM PROVIDED HISTORY: Left leg pain TECHNOLOGIST PROVIDED HISTORY: patient slipped and a piece of farm eqpt fell onto left leg yesterday Reason for Exam: A piece of farm equipment fell on pts left leg yesterday; area of swelling and redness to distal left lower leg Acuity: Acute Type of Exam: Initial FINDINGS: Focal soft tissue swelling/bulge at the anterior aspect of the lower leg corresponds is site of injury. No retained radiopaque foreign body. No fracture or dislocation. Bone mineralization is within normal limits. No acute osseous abnormality       X-rays personally reviewed by me of 2 views left tib-fib reviewed from about 2 weeks ago reveal no acute bony abnormalities. Diagnosis Orders   1. Left leg pain           PLAN:  Continue with daily dry dressing changes activity as tolerated weightbearing as tolerated should the symptoms worsen in any way should come back and see us otherwise we will see him back here in as-needed basis    No orders of the defined types were placed in this encounter.        Procedure:        Electronically signed by Bev Rowley PA-C on 7/7/2020 at 10:28 AM

## 2020-07-08 ENCOUNTER — OFFICE VISIT (OUTPATIENT)
Dept: FAMILY MEDICINE CLINIC | Age: 70
End: 2020-07-08
Payer: MEDICARE

## 2020-07-08 VITALS
HEART RATE: 70 BPM | BODY MASS INDEX: 27.32 KG/M2 | DIASTOLIC BLOOD PRESSURE: 80 MMHG | TEMPERATURE: 97.1 F | SYSTOLIC BLOOD PRESSURE: 138 MMHG | OXYGEN SATURATION: 97 % | RESPIRATION RATE: 20 BRPM | WEIGHT: 190.4 LBS

## 2020-07-08 PROCEDURE — 99214 OFFICE O/P EST MOD 30 MIN: CPT | Performed by: NURSE PRACTITIONER

## 2020-07-08 PROCEDURE — 90750 HZV VACC RECOMBINANT IM: CPT | Performed by: NURSE PRACTITIONER

## 2020-07-08 PROCEDURE — 99211 OFF/OP EST MAY X REQ PHY/QHP: CPT

## 2020-07-08 PROCEDURE — 1111F DSCHRG MED/CURRENT MED MERGE: CPT | Performed by: NURSE PRACTITIONER

## 2020-07-08 PROCEDURE — 96372 THER/PROPH/DIAG INJ SC/IM: CPT | Performed by: NURSE PRACTITIONER

## 2020-07-08 ASSESSMENT — ENCOUNTER SYMPTOMS
RESPIRATORY NEGATIVE: 1
ABDOMINAL PAIN: 0
COLOR CHANGE: 1

## 2020-07-08 NOTE — PROGRESS NOTES
[address]  [phone]          Post-Discharge Transitional Care Management Services or Hospital Follow Up      Deacon Palma. YOB: 1950    Date of Office Visit:  7/8/2020  Date of Hospital Admission: 6/21/20  Date of Hospital Discharge: 6/24/20  Readmission Risk Score(high >=14%. Medium >=10%):Readmission Risk Score: 13      Care management risk score Rising risk (score 2-5) and Complex Care (Scores >=6): 2     Non face to face  following discharge, date last encounter closed (first attempt may have been earlier): *No documented post hospital discharge outreach found in the last 14 days *No documented post hospital discharge outreach found in the last 14 days    Call initiated 2 business days of discharge: *No response recorded in the last 14 days     Patient Active Problem List   Diagnosis    Abnormal ECG    RBBB    LEDESMA (dyspnea on exertion)    Chronic obstructive pulmonary disease ([address]    Cellulitis    Hyperlipidemia    Hypertension    Thrombocytosis ([address]    Peripheral vascular disease ([address]    Peripheral neuropathy    Depression    Rosacea    Urinary retention       Allergies   Allergen Reactions    Diclofenac Sodium        Medications listed as ordered at the time of discharge from hospital   Dl Maria.    Home Medication Instructions [id number]    Printed on:07/08/20 1096   Medication Information                      albuterol sulfate HFA (PROAIR HFA) 108 (90 Base) MCG/ACT inhaler  Inhale 2 puffs into the lungs every 6 hours as needed for Wheezing or Shortness of Breath             bethanechol (URECHOLINE) 25 MG tablet  Take 1 tablet by mouth 3 times daily             Budesonide-Formoterol Fumarate (SYMBICORT IN)  Inhale 2 puffs into the lungs daily             Cyanocobalamin (VITAMIN B-12) 1000 MCG extended release tablet  Take 1,500 mcg by mouth daily             gabapentin (NEURONTIN) 600 MG tablet  Take daily. 45 g 3    theophylline (THEODUR) 300 MG extended release tablet Take 1 tablet by mouth 2 times daily      Budesonide-Formoterol Fumarate (SYMBICORT IN) Inhale 2 puffs into the lungs daily      albuterol sulfate HFA (PROAIR HFA) 108 (90 Base) MCG/ACT inhaler Inhale 2 puffs into the lungs every 6 hours as needed for Wheezing or Shortness of Breath 1 Inhaler 3    Cyanocobalamin (VITAMIN B-12) 1000 MCG extended release tablet Take 1,500 mcg by mouth daily      Multiple Vitamins-Minerals (MULTIVITAMIN & MINERAL PO) Take 1 tablet by mouth Indications: When he remembers      sildenafil (VIAGRA) 50 MG tablet Take 1 tablet by mouth as needed for Erectile Dysfunction 30 tablet 3    hypromellose 0.4 % SOLN ophthalmic solution Place 1 drop into both eyes 3 times daily      verapamil (CALAN-SR) 180 MG CR tablet Take 180 mg by mouth daily. Medications patient taking as of now reconciled against medications ordered at time of hospital discharge: Yes    Chief Complaint   Patient presents with    Follow-Up from Hospital     discharge [facility]6/25 left lower leg cellulitis       HPI    Inpatient course: Discharge summary reviewed- see chart. Interval history/Current status: Patient states he is not having any fevers. His leg is improving. He is having mild pain. He describes it as \"bone pain\" which only lasts a few seconds. He follows up with urology next week. Patient continues to go to the [place] and see hematology for his blood disorder. Review of Systems   Constitutional: Negative for fatigue and fever. HENT: Negative. Respiratory: Negative. Cardiovascular: Negative. Gastrointestinal: Negative for abdominal pain. Genitourinary: Positive for difficulty urinating and dysuria. Musculoskeletal: Positive for arthralgias and myalgias. Skin: Positive for color change. Negative for rash. Psychiatric/Behavioral: Negative.         Vitals:    07/08/20 1530   BP: 138/80   Site: Right Upper Arm   Position: Sitting   Cuff Size: Medium Adult   Pulse: (!) 20   Resp: 20   Temp: 97.1 °F (36.2 °C)   TempSrc: Tympanic   SpO2: 97%   Weight: 190 lb 6.4 oz (86.4 kg)     Body mass index is 27.32 kg/m². Wt Readings from Last 3 Encounters:   07/08/20 190 lb 6.4 oz (86.4 kg)   07/07/20 189 lb (85.7 kg)   06/24/20 190 lb 9.6 oz (86.5 kg)     BP Readings from Last 3 Encounters:   07/08/20 138/80   07/07/20 128/70   06/24/20 139/78       Physical Exam  Vitals signs and nursing note reviewed. Constitutional:       Appearance: Normal appearance. He is normal weight. HENT:      Head: Normocephalic and atraumatic. Nose: Nose normal.      Mouth/Throat:      Pharynx: Oropharynx is clear. Eyes:      Conjunctiva/sclera: Conjunctivae normal.   Cardiovascular:      Rate and Rhythm: Normal rate and regular rhythm. Pulmonary:      Effort: Pulmonary effort is normal.      Breath sounds: Normal breath sounds. Musculoskeletal:        Legs:    Skin:     General: Skin is warm and dry. Findings: No rash. Neurological:      General: No focal deficit present. Mental Status: He is alert and oriented to person, place, and time. Psychiatric:         Mood and Affect: Mood normal.         Behavior: Behavior normal.         Thought Content: Thought content normal.         Judgment: Judgment normal.             Assessment/Plan:  1. Left leg cellulitis  - SC DISCHARGE MEDS RECONCILED W/ CURRENT OUTPATIENT MED LIST    2. Bladder outlet obstruction  - SC DISCHARGE MEDS RECONCILED W/ CURRENT OUTPATIENT MED LIST        Medical Decision Making: moderate complexity     Return in 1 month (on 8/8/2020), or if symptoms worsen or fail to improve. No orders of the defined types were placed in this encounter. Orders Placed This Encounter   Procedures    SC DISCHARGE MEDS RECONCILED W/ CURRENT OUTPATIENT MED LIST       Patient given educational materials - see patient instructions. All patient questions answered. Pt voiced understanding. Reviewed health maintenance. Instructed to continue current medications, diet and exercise.       Electronically signed by AIMEE Benz CNP on 7/8/2020 at 4:13 PM

## 2020-07-13 ENCOUNTER — OFFICE VISIT (OUTPATIENT)
Dept: UROLOGY | Age: 70
End: 2020-07-13
Payer: MEDICARE

## 2020-07-13 VITALS
HEART RATE: 66 BPM | WEIGHT: 188.4 LBS | DIASTOLIC BLOOD PRESSURE: 72 MMHG | BODY MASS INDEX: 26.97 KG/M2 | SYSTOLIC BLOOD PRESSURE: 126 MMHG | HEIGHT: 70 IN

## 2020-07-13 PROCEDURE — 1123F ACP DISCUSS/DSCN MKR DOCD: CPT | Performed by: UROLOGY

## 2020-07-13 PROCEDURE — 51798 US URINE CAPACITY MEASURE: CPT | Performed by: UROLOGY

## 2020-07-13 PROCEDURE — 1111F DSCHRG MED/CURRENT MED MERGE: CPT | Performed by: UROLOGY

## 2020-07-13 PROCEDURE — 4040F PNEUMOC VAC/ADMIN/RCVD: CPT | Performed by: UROLOGY

## 2020-07-13 PROCEDURE — 99214 OFFICE O/P EST MOD 30 MIN: CPT

## 2020-07-13 PROCEDURE — G8427 DOCREV CUR MEDS BY ELIG CLIN: HCPCS | Performed by: UROLOGY

## 2020-07-13 PROCEDURE — 99214 OFFICE O/P EST MOD 30 MIN: CPT | Performed by: UROLOGY

## 2020-07-13 PROCEDURE — G8417 CALC BMI ABV UP PARAM F/U: HCPCS | Performed by: UROLOGY

## 2020-07-13 PROCEDURE — 3017F COLORECTAL CA SCREEN DOC REV: CPT | Performed by: UROLOGY

## 2020-07-13 PROCEDURE — 1036F TOBACCO NON-USER: CPT | Performed by: UROLOGY

## 2020-07-13 RX ORDER — TAMSULOSIN HYDROCHLORIDE 0.4 MG/1
0.4 CAPSULE ORAL DAILY
Qty: 90 CAPSULE | Refills: 3 | Status: SHIPPED | OUTPATIENT
Start: 2020-07-13

## 2020-07-13 NOTE — PROGRESS NOTES
Diagnosis Date    Abdominal aorta thrombosis (HCC)     Alcohol abuse     Arterial thrombosis (HCC)     of lower extremity    Bipolar 1 disorder (HCC)     COPD (chronic obstructive pulmonary disease) (HCC)     Fracture of ulnar styloid     Headache(784.0)     Tesion    History of tobacco use     Hyperlipidemia     Hypertension     JAK2 V617F mutation     [facility]     Neck pain, chronic     Other chest pain 9/10/2018    Posttraumatic stress disorder     with panic disorder and anxiety    Tremor     Vasculitis (HCC)     Venous thrombosis of extremity      Past Surgical History:   Procedure Laterality Date    APPENDECTOMY      CARDIAC CATHETERIZATION  7/10    COLONOSCOPY  2015    ELBOW SURGERY Left 1999    TONSILLECTOMY      VASECTOMY       Family History   Problem Relation Age of Onset    High Blood Pressure Mother     Macular Degen Mother     High Blood Pressure Sister     Cancer Sister         ovarian    Macular Degen Sister      Outpatient Medications Marked as Taking for the 7/13/20 encounter (Office Visit) with Marlena Reese MD   Medication Sig Dispense Refill    tamsulosin (FLOMAX) 0.4 MG capsule Take 1 capsule by mouth daily 90 capsule 3    lovastatin (MEVACOR) 10 MG tablet Take 0.5 tablets by mouth daily 30 tablet 0    omeprazole (PRILOSEC) 20 MG delayed release capsule Take 20 mg by mouth daily      gabapentin (NEURONTIN) 600 MG tablet Take 1 tablet by mouth 3 times daily.  warfarin (COUMADIN) 5 MG tablet Take 5 mg by mouth Take1 full tablet MWF and 0.5 tablets on the other days      vitamin C (ASCORBIC ACID) 500 MG tablet Take 500 mg by mouth daily      hydroxyurea (HYDREA) 500 MG chemo capsule Take 1,000 mg by mouth daily      metroNIDAZOLE (METROGEL) 0.75 % gel Apply topically 2 times daily.  45 g 3    theophylline (THEODUR) 300 MG extended release tablet Take 1 tablet by mouth 2 times daily      Budesonide-Formoterol Fumarate (SYMBICORT IN) Inhale 2 puffs into the lungs daily      albuterol sulfate HFA (PROAIR HFA) 108 (90 Base) MCG/ACT inhaler Inhale 2 puffs into the lungs every 6 hours as needed for Wheezing or Shortness of Breath 1 Inhaler 3    Cyanocobalamin (VITAMIN B-12) 1000 MCG extended release tablet Take 1,500 mcg by mouth daily      Multiple Vitamins-Minerals (MULTIVITAMIN & MINERAL PO) Take 1 tablet by mouth Indications: When he remembers      sildenafil (VIAGRA) 50 MG tablet Take 1 tablet by mouth as needed for Erectile Dysfunction 30 tablet 3    hypromellose 0.4 % SOLN ophthalmic solution Place 1 drop into both eyes 3 times daily      verapamil (CALAN-SR) 180 MG CR tablet Take 180 mg by mouth daily. Diclofenac sodium  Social History     Tobacco Use   Smoking Status Former Smoker    Packs/day: 0.50    Years: 40.00    Pack years: 20.00    Types: Cigarettes    Last attempt to quit: 10/11/2018    Years since quittin.7   Smokeless Tobacco Never Used   Tobacco Comment    cwaltmire rrt 20      (If patient a smoker, smoking cessation counseling offered)   Social History     Substance and Sexual Activity   Alcohol Use Yes    Alcohol/week: 11.7 standard drinks    Types: 14 Standard drinks or equivalent per week       REVIEW OF SYSTEMS:  Constitutional: negative  Eyes: negative  Respiratory: negative  Cardiovascular: negative  Gastrointestinal: negative  Genitourinary: see HPI  Musculoskeletal: negative  Skin: negative   Neurological: negative  Hematological/Lymphatic: negative  Psychological: negative        Physical Exam:    This a 68 y.o. male  Vitals:    20 1045   BP: 126/72   Pulse: 66     Body mass index is 27.03 kg/m². Constitutional: Patient in no acute distress;         Assessment and Plan        1. Benign localized prostatic hyperplasia with lower urinary tract symptoms (LUTS)    2. Urinary retention    3.  Erectile dysfunction, unspecified erectile dysfunction type               Plan:        Stop urecholine  Will start flomax  Doing well from voiding standpoint  Sildenafil for ED. Pt states he has low libido.   Follow up in one year with PSA and med check          Prescriptions Ordered:  Orders Placed This Encounter   Medications    tamsulosin (FLOMAX) 0.4 MG capsule     Sig: Take 1 capsule by mouth daily     Dispense:  90 capsule     Refill:  3      Orders Placed:  Orders Placed This Encounter   Procedures    PSA, Diagnostic     Standing Status:   Future     Standing Expiration Date:   7/13/2022    UT MEASUREMENT,POST-VOID Shonda Og MD

## 2020-09-14 ENCOUNTER — TELEPHONE (OUTPATIENT)
Dept: ONCOLOGY | Age: 70
End: 2020-09-14

## 2020-09-14 NOTE — TELEPHONE ENCOUNTER
Our records indicate that your patient is due for their annual lung cancer screening follow up testing. Please update smoking history in  Epic to reflect a 30 pack year smoking history. Then place order. Sincerely,    [address] [place] [address] Screening Program    Auto printed reminder letter sent to patient.

## 2020-09-16 NOTE — TELEPHONE ENCOUNTER
His social history shows a 30 pack year history:    Smoking: Former Smoker (Quit Date: 10/11/2018), 0.75 ppd, 30 pack-years

## 2020-09-16 NOTE — TELEPHONE ENCOUNTER
Patient does not meet criteria for lung screening. Epic and the order shows a 20 pack year smoking history. If this is accurate patient does not qualify. If patient smoked more than the .5 pack at his greatest point then he would qualify. Epic would need updated then place a new order. Thank you! CT LUNG SCREENING CRITERIA   Patient is between 50-69 years [Medicare] or 46-80 [private insurance]   Is currently smoking or is a former smoker who has quit smoking within the last 15 years   Has at least a 30 pack-year smoking history (regardless of patient being a current or former smoker). Pack history is packs per day times years smoked equals pack history. IE:  1 pack a day X 30 years = 30 pack year history.  Has not had a CT lung screening or any CT chest exam within the last year   Patient is asymptomatic (no signs/symptoms of lung cancer such as shortness of breath, cough, coughing up blood or unexplained significant weight loss).  This patient does not have a condition that limits life expectancy to <5 years   This patient has participated in a shared decision-making session during which potential risks and benefits of CT Lung Screening were discussed.  This patient was informed of the importance of adherence to annual screening, impact of comorbidities, and ability/willingness to undergo diagnosis and treatment.  This patient was informed of the importance of smoking cessation and/or maintaining smoking abstinence. If applicable, this patient was offered information on smoking cessation counseling services. ORDER MUST INCLUDE: THIS INFORMATION IS AUTOMATICALLY ON ORDER IF ENTERED IN EPIC   PROVIDER'S NPI - AUTOMATICALLY APPEARS ON ORDER WHEN SIGNED BY THE PROVIDER.  PACK HISTORY    QUIT DATE IF THEY HAVE QUIT   DIAGNOSIS: Z87.891 PERSONAL HISTORY OF TABACCO USE OR PERSONAL HISTORY OF NICOTINE DEPENDENCE. (BOTH HAS THE SAME Z CODE) THIS IS REQUIRED FOR MEDICARE/MEDICAID.        FOR PRIVATE INSURANCES DIAGNOSIS CODE CAN BE: Z12.2 ENCOUNTER FOR SCREENING FOR MALIGNANT NEOPLASM OF RESPIRATORY ORGANS OR Z87.891 PERSONAL HISTORY OF TABACCO USE OR PERSONAL HISTORY OF NICOTINE DEPENDENCE.

## 2020-09-16 NOTE — TELEPHONE ENCOUNTER
Epic is good , but the order shows a 20 pack history. I pended a new order for your convenience to please sign.  Thank you

## 2020-09-21 ENCOUNTER — TELEPHONE (OUTPATIENT)
Dept: ONCOLOGY | Age: 70
End: 2020-09-21

## 2020-09-21 NOTE — LETTER
9/21/2020        [facility]    Dear Cindy Myrick.:    Your healthcare provider has ordered a low dose CT scan of the chest for lung cancer screening. You will find enclosed, information about CT lung screening. Please review the statement of understanding, you will be asked to sign a copy of this at the time of your CT scan    If you have not already been contacted to make the appointment for your scan, please call our scheduling department at [phone]    Keep in mind that CT lung screening does not take the place of smoking cessation. If you are a current smoker, you will find enclosed smoking cessation resources. Please do not hesitate to contact me if you have any questions or concerns.     [address] [place] [address],      [facility] Lung Screening Program  [phone]

## 2020-10-02 NOTE — CARE COORDINATION
appointment yet with PCP, writer called [facility] talked to Keiry Nogueira and she will call patient for ED follow up appointment lower left extremity cellulitis. Per ED note, patient should be seen today. Patient's wife is picking up Keflex and Bactrim DS from pharmacy.   Patient declined Loop enrollment
mother

## 2020-10-06 ENCOUNTER — HOSPITAL ENCOUNTER (OUTPATIENT)
Dept: CT IMAGING | Age: 70
Discharge: HOME | End: 2020-10-08
Payer: MEDICARE

## 2020-10-06 PROCEDURE — G0297 LDCT FOR LUNG CA SCREEN: HCPCS

## 2020-12-16 ENCOUNTER — OFFICE VISIT (OUTPATIENT)
Dept: FAMILY MEDICINE CLINIC | Age: 70
End: 2020-12-16
Payer: MEDICARE

## 2020-12-16 VITALS
SYSTOLIC BLOOD PRESSURE: 130 MMHG | OXYGEN SATURATION: 99 % | HEART RATE: 79 BPM | TEMPERATURE: 96.2 F | DIASTOLIC BLOOD PRESSURE: 80 MMHG | WEIGHT: 193.8 LBS | BODY MASS INDEX: 27.81 KG/M2 | RESPIRATION RATE: 20 BRPM

## 2020-12-16 PROCEDURE — G8926 SPIRO NO PERF OR DOC: HCPCS | Performed by: NURSE PRACTITIONER

## 2020-12-16 PROCEDURE — 99211 OFF/OP EST MAY X REQ PHY/QHP: CPT

## 2020-12-16 PROCEDURE — 3017F COLORECTAL CA SCREEN DOC REV: CPT | Performed by: NURSE PRACTITIONER

## 2020-12-16 PROCEDURE — 4040F PNEUMOC VAC/ADMIN/RCVD: CPT | Performed by: NURSE PRACTITIONER

## 2020-12-16 PROCEDURE — 3023F SPIROM DOC REV: CPT | Performed by: NURSE PRACTITIONER

## 2020-12-16 PROCEDURE — 99213 OFFICE O/P EST LOW 20 MIN: CPT | Performed by: NURSE PRACTITIONER

## 2020-12-16 PROCEDURE — 1123F ACP DISCUSS/DSCN MKR DOCD: CPT | Performed by: NURSE PRACTITIONER

## 2020-12-16 PROCEDURE — G8417 CALC BMI ABV UP PARAM F/U: HCPCS | Performed by: NURSE PRACTITIONER

## 2020-12-16 PROCEDURE — G8427 DOCREV CUR MEDS BY ELIG CLIN: HCPCS | Performed by: NURSE PRACTITIONER

## 2020-12-16 PROCEDURE — 1036F TOBACCO NON-USER: CPT | Performed by: NURSE PRACTITIONER

## 2020-12-16 PROCEDURE — G8484 FLU IMMUNIZE NO ADMIN: HCPCS | Performed by: NURSE PRACTITIONER

## 2020-12-16 RX ORDER — AMOXICILLIN AND CLAVULANATE POTASSIUM 875; 125 MG/1; MG/1
1 TABLET, FILM COATED ORAL 2 TIMES DAILY
Qty: 20 TABLET | Refills: 0 | Status: SHIPPED | OUTPATIENT
Start: 2020-12-16 | End: 2020-12-26

## 2020-12-16 ASSESSMENT — ENCOUNTER SYMPTOMS: COLOR CHANGE: 1

## 2020-12-16 NOTE — PATIENT INSTRUCTIONS
Patient Education        Animal Bites: Care Instructions  Your Care Instructions  After an animal bite, the biggest concern is infection. The chance of infection depends on the type of animal that bit you, where on your body you were bitten, and your general health. Many animal bites are not closed with stitches, because this can increase the chance of infection. Your bite may take as little as 7 days or as long as several months to heal, depending on how bad it is. Taking good care of your wound at home will help it heal and reduce your chance of infection. The doctor has checked you carefully, but problems can develop later. If you notice any problems or new symptoms, get medical treatment right away. Follow-up care is a key part of your treatment and safety. Be sure to make and go to all appointments, and call your doctor if you are having problems. It's also a good idea to know your test results and keep a list of the medicines you take. How can you care for yourself at home? · If your doctor told you how to care for your wound, follow your doctor's instructions. If you did not get instructions, follow this general advice:  ? After 24 to 48 hours, gently wash the wound with clean water 2 times a day. Do not scrub or soak the wound. Don't use hydrogen peroxide or alcohol, which can slow healing. ? You may cover the wound with a thin layer of petroleum jelly, such as Vaseline, and a nonstick bandage. ? Apply more petroleum jelly and replace the bandage as needed. · After you shower, gently dry the wound with a clean towel. · If your doctor has closed the wound, cover the bandage with a plastic bag before you take a shower. · A small amount of skin redness and swelling around the wound edges and the stitches or staples is normal. Your wound may itch or feel irritated. Do not scratch or rub the wound.   · Ask your doctor if you can take an over-the-counter pain medicine, such as acetaminophen (Tylenol), ibuprofen (Advil, Motrin), or naproxen (Aleve). Read and follow all instructions on the label. · Do not take two or more pain medicines at the same time unless the doctor told you to. Many pain medicines have acetaminophen, which is Tylenol. Too much acetaminophen (Tylenol) can be harmful. · If your bite puts you at risk for rabies, you will get a series of shots over the next few weeks to prevent rabies. Your doctor will tell you when to get the shots. It is very important that you get the full cycle of shots. Follow your doctor's instructions exactly. · You may need a tetanus shot if you have not received one in the last 5 years. · If your doctor prescribed antibiotics, take them as directed. Do not stop taking them just because you feel better. You need to take the full course of antibiotics. When should you call for help? Call your doctor now or seek immediate medical care if:    · The skin near the bite turns cold or pale or it changes color.     · You lose feeling in the area near the bite, or it feels numb or tingly.     · You have trouble moving a limb near the bite.     · You have symptoms of infection, such as:  ? Increased pain, swelling, warmth, or redness near the wound. ? Red streaks leading from the wound. ? Pus draining from the wound. ? A fever.     · Blood soaks through the bandage. Oozing small amounts of blood is normal.     · Your pain is getting worse. Watch closely for changes in your health, and be sure to contact your doctor if you are not getting better as expected. Where can you learn more? Go to https://[employer].NuFlick. org and sign in to your [employer] account. Enter U630 in the [employer] box to learn more about \"Animal Bites: Care Instructions. \"     If you do not have an account, please click on the \"Sign Up Now\" link. Current as of: June 26, 2019               Content Version: 12.6  © 1505-2411 [employer].    Care instructions adapted under license by [facility] ([facility]). If you have questions about a medical condition or this instruction, always ask your healthcare professional. Sreekanth Ye any warranty or liability for your use of this information. Patient Education        bifidobacterium and lactobacillus  Pronunciation:  BIF eye godinez jennifer BUBBA ee um and LAK toe ba SHANTELL us  Brand:  Controlled Delivery Probiotic, Florajen3, Nature's Bounty Probiotic, [employer], Primadophilus Bifidus, Probiotic Formula, Prodigen, Provella, Zelac  What is the most important information I should know about bifidobacterium and lactobacillus? Do not use this product without medical advice. Follow all directions on the product label and package. Tell each of your healthcare providers about all your medical conditions, allergies, and all medicines you use. What is bifidobacterium and lactobacillus? Bifidobacterium and lactobacillus is a medical food used as a probiotic, or \"friendly bacteria\" to maintain a healthy digestive tract (stomach and intestines). It may also help maintain healthy bacteria in the vagina. Bifidobacterium and lactobacillus is used in people with irritable bowel syndrome, ulcerative colitis, or an ileal pouch. Bifidobacterium and lactobacillus is also used in women to support vaginal health. Not all uses for bifidobacterium and lactobacillus have been approved by the FDA. Bifidobacterium and lactobacillus should not be used in place of medication prescribed for you by your doctor. Bifidobacterium and lactobacillus may also be used for purposes not listed in this product guide. What should I discuss with my healthcare provider before taking bifidobacterium and lactobacillus? Before using bifidobacterium and lactobacillus, talk to your healthcare provider. You may not be able to use bifidobacterium and lactobacillus if you have certain medical conditions.   Bifidobacterium and lactobacillus is available in capsule, tablet, powder, and chewable tablet formulations. Do not use different formulations at the same time without medical advice. Ask a doctor, pharmacist, or other healthcare provider if it is safe for you to use this product if you have:  · milk allergy or lactose intolerance; or  · if you are taking an antibiotic medication. Ask a doctor before using this medicine if you are pregnant or breastfeeding. Do not give any herbal/health supplement to a child without medical advice. How should I take bifidobacterium and lactobacillus? Use exactly as directed on the label, or as prescribed by your doctor. Check your product label to see whether you should take this product with or without food. You must chew the chewable tablet before you swallow it. Mix the oral powder with a cold non-carbonated drink or soft food (applesauce, yogurt, ice cream) and eat this mixture right away. Do not save for later use. Call your doctor if your symptoms do not improve, or if they get worse while using bifidobacterium and lactobacillus. Store your medication as directed away from moisture, heat, and light. You may need to store the medicine in a refrigerator. Heat or humidity may affect the live bacteria in this product, making it less effective. What happens if I miss a dose? Skip the missed dose and use your next dose at the regular time. Do not use two doses at one time. What happens if I overdose? Seek emergency medical attention or call the Poison Help line at 1-977.388.4829. What should I avoid while taking bifidobacterium and lactobacillus? Do not mix the oral powder with hot liquids or foods. What are the possible side effects of bifidobacterium and lactobacillus? Get emergency medical help if you have signs of an allergic reaction: hives; difficult breathing; swelling of your face, lips, tongue, or throat. Common side effects may include:  · stomach bloating or discomfort.   This is not a complete list of side effects and others may occur. Call your doctor for medical advice about side effects. You may report side effects to FDA at 4-137-TZC-2720. What other drugs will affect bifidobacterium and lactobacillus? Other drugs may affect bifidobacterium and lactobacillus, including prescription and over-the-counter medicines, vitamins, and herbal products. Tell your doctor about all your current medicines and any medicine you start or stop using. Where can I get more information? Consult with a licensed healthcare professional before using any herbal/health supplement. Whether you are treated by a medical doctor or a practitioner trained in the use of natural medicines/supplements, make sure all your healthcare providers know about all of your medical conditions and treatments. Remember, keep this and all other medicines out of the reach of children, never share your medicines with others, and use this medication only for the indication prescribed. Every effort has been made to ensure that the information provided by [address] is accurate, up-to-date, and complete, but no guarantee is made to that effect. Drug information contained herein may be time sensitive. [employer] information has been compiled for use by healthcare practitioners and consumers in the [place] and therefore [employer] does not warrant that uses outside of the [place] are appropriate, unless specifically indicated otherwise. [employer]'s drug information does not endorse drugs, diagnose patients or recommend therapy. [employer] drug information is an informational resource designed to assist licensed healthcare practitioners in caring for their patients and/or to serve consumers viewing this service as a supplement to, and not a substitute for, the expertise, skill, knowledge and judgment of healthcare practitioners.  The absence of a warning for a given drug or drug combination in no way should be construed to indicate that the drug or drug combination is safe, effective or appropriate for any given patient. [facility] does not assume any responsibility for any aspect of healthcare administered with the aid of information [facility] provides. The information contained herein is not intended to cover all possible uses, directions, precautions, warnings, drug interactions, allergic reactions, or adverse effects. If you have questions about the drugs you are taking, check with your doctor, nurse or pharmacist.  Copyright 1283-1234 [address]: 2.01. Revision date: 2/24/2020. Care instructions adapted under license by [facility] ([facility]). If you have questions about a medical condition or this instruction, always ask your healthcare professional. [place] [address] any warranty or liability for your use of this information.

## 2020-12-16 NOTE — PROGRESS NOTES
puffs into the lungs every 6 hours as needed for Wheezing or Shortness of Breath Yes AIMEE Thomas CNP   Cyanocobalamin (VITAMIN B-12) 1000 MCG extended release tablet Take 1,500 mcg by mouth daily Yes Historical Provider, MD   Multiple Vitamins-Minerals (MULTIVITAMIN & MINERAL PO) Take 1 tablet by mouth Indications: When he remembers Yes Historical Provider, MD   sildenafil (VIAGRA) 50 MG tablet Take 1 tablet by mouth as needed for Erectile Dysfunction Yes AIMEE Thomas CNP   hypromellose 0.4 % SOLN ophthalmic solution Place 1 drop into both eyes 3 times daily Yes Historical Provider, MD   verapamil (CALAN-SR) 180 MG CR tablet Take 180 mg by mouth daily. Yes Historical Provider, MD   tamsulosin (FLOMAX) 0.4 MG capsule Take 1 capsule by mouth daily  Patient not taking: Reported on 2020  Arturo Kunz MD        Social History     Tobacco Use    Smoking status: Former Smoker     Packs/day: 0.75     Years: 40.00     Pack years: 30.00     Types: Cigarettes     Quit date: 10/11/2018     Years since quittin.1    Smokeless tobacco: Never Used    Tobacco comment: miguel rrt 20   Substance Use Topics    Alcohol use: Yes     Alcohol/week: 11.7 standard drinks     Types: 14 Standard drinks or equivalent per week        Vitals:    20 1115   BP: 130/80   Site: Right Upper Arm   Position: Sitting   Cuff Size: Medium Adult   Pulse: 79   Resp: 20   Temp: 96.2 °F (35.7 °C)   TempSrc: Tympanic   SpO2: 99%   Weight: 193 lb 12.8 oz (87.9 kg)     Estimated body mass index is 27.81 kg/m² as calculated from the following:    Height as of 20: 5' 10\" (1.778 m). Weight as of this encounter: 193 lb 12.8 oz (87.9 kg). Physical Exam  Vitals signs and nursing note reviewed. Constitutional:       Appearance: Normal appearance. HENT:      Head: Normocephalic and atraumatic. Eyes:      Conjunctiva/sclera: Conjunctivae normal.   Cardiovascular:      Rate and Rhythm: Normal rate. Pulmonary:      Effort: Pulmonary effort is normal.   Skin:     General: Skin is warm and dry. Neurological:      General: No focal deficit present. Mental Status: He is alert and oriented to person, place, and time. Mental status is at baseline. Psychiatric:         Mood and Affect: Mood normal.         Behavior: Behavior normal.         Thought Content: Thought content normal.         Judgment: Judgment normal.         ASSESSMENT/PLAN:  1. Animal scratch  - Keep area clean and dry  - Use a probiotic 2 hours before or after antibiotic  - amoxicillin-clavulanate (AUGMENTIN) 875-125 MG per tablet; Take 1 tablet by mouth 2 times daily for 10 days  Dispense: 20 tablet; Refill: 0    2. [facility])  Being evaluated/managed by [place]. No acute findings today meriting change in management plan. 3. Thrombocytosis ([facility]  Being evaluated/managed by [place]. No acute findings today meriting change in management plan. 4. COPD with asthma ([facility].)  Being evaluated/managed by [place]. No acute findings today meriting change in management plan. Return if symptoms worsen or fail to improve. Orders Placed This Encounter   Medications    amoxicillin-clavulanate (AUGMENTIN) 875-125 MG per tablet     Sig: Take 1 tablet by mouth 2 times daily for 10 days     Dispense:  20 tablet     Refill:  0     No orders of the defined types were placed in this encounter. Patient given educational materials - see patient instructions. Discussed use, benefit, and side effects of prescribed medications. All patient questions answered. Pt voiced understanding. Reviewed health maintenance. Instructed to continue current medications, diet and exercise.       Electronically signed by AIMEE Tucker CNP on 12/16/2020 at 1:41 PM

## 2021-09-16 ENCOUNTER — TELEPHONE (OUTPATIENT)
Dept: ONCOLOGY | Age: 71
End: 2021-09-16

## 2021-10-22 ENCOUNTER — TELEPHONE (OUTPATIENT)
Dept: FAMILY MEDICINE CLINIC | Age: 71
End: 2021-10-22

## 2021-10-22 DIAGNOSIS — Z12.2 SCREENING FOR LUNG CANCER: Primary | ICD-10-CM

## 2021-10-22 DIAGNOSIS — Z87.891 PERSONAL HISTORY OF TOBACCO USE: ICD-10-CM

## 2021-10-22 NOTE — TELEPHONE ENCOUNTER
PT called and stated he needs an order put in for his annual CT scan of chest. He will get it done in [place].

## 2021-10-26 ENCOUNTER — TELEPHONE (OUTPATIENT)
Dept: ONCOLOGY | Age: 71
End: 2021-10-26

## 2021-10-26 NOTE — LETTER
10/26/2021        [address] [place] [address]    Dear Johnye Meckel.:    Your healthcare provider has ordered a low dose CT scan of the chest for lung cancer screening. You will find enclosed, information about CT lung screening. Please review the statement of understanding, you will be asked to sign a copy of this at the time of your CT scan    If you have not already been contacted to make the appointment for your scan, please call our scheduling department at [phone]    Keep in mind that CT lung screening does not take the place of smoking cessation. If you are a current smoker, you will find enclosed smoking cessation resources. Please do not hesitate to contact me if you have any questions or concerns.     [facility],      [id number] [place] [address] Lung Screening Program  [phone]

## 2021-11-03 ENCOUNTER — HOSPITAL ENCOUNTER (OUTPATIENT)
Dept: CT IMAGING | Age: 71
Discharge: HOME | End: 2021-11-05
Payer: MEDICARE

## 2021-11-03 DIAGNOSIS — Z12.2 SCREENING FOR LUNG CANCER: ICD-10-CM

## 2021-11-03 DIAGNOSIS — Z87.891 PERSONAL HISTORY OF TOBACCO USE: ICD-10-CM

## 2021-11-03 PROCEDURE — 71271 CT THORAX LUNG CANCER SCR C-: CPT

## 2022-10-04 ENCOUNTER — TELEPHONE (OUTPATIENT)
Dept: ONCOLOGY | Age: 72
End: 2022-10-04

## 2022-11-01 ENCOUNTER — TELEPHONE (OUTPATIENT)
Dept: FAMILY MEDICINE CLINIC | Age: 72
End: 2022-11-01

## 2022-11-01 DIAGNOSIS — Z87.891 PERSONAL HISTORY OF TOBACCO USE: ICD-10-CM

## 2022-11-01 DIAGNOSIS — J44.9 COPD WITH ASTHMA (HCC): ICD-10-CM

## 2022-11-01 DIAGNOSIS — Z12.2 SCREENING FOR LUNG CANCER: Primary | ICD-10-CM

## 2022-11-01 NOTE — TELEPHONE ENCOUNTER
Patient scheduled appointment for 11/10 to establish.       ----- Message from Jenna Poole sent at 11/1/2022  9:58 AM EDT -----  Subject: Message to Provider    QUESTIONS  Information for Provider? Pt states that every year he has a CT scan and   that they reached out to him and said that it was time for it. Needs an   order sent for this. Would like a call back  ---------------------------------------------------------------------------  --------------  Melissa AVALOS  [phone]; OK to leave message on voicemail  ---------------------------------------------------------------------------  --------------  SCRIPT ANSWERS  Relationship to Patient?  Self

## 2022-11-01 NOTE — TELEPHONE ENCOUNTER
I called pt to see where he would like CT completed. Pt wife answered, and she stated that she will have him call the office back to let us know where to fax order to.

## 2022-11-08 ENCOUNTER — HOSPITAL ENCOUNTER (OUTPATIENT)
Dept: CT IMAGING | Age: 72
Discharge: HOME | End: 2022-11-10
Payer: MEDICARE

## 2022-11-08 DIAGNOSIS — J44.9 COPD WITH ASTHMA (HCC): ICD-10-CM

## 2022-11-08 DIAGNOSIS — Z12.2 SCREENING FOR LUNG CANCER: ICD-10-CM

## 2022-11-08 DIAGNOSIS — Z87.891 PERSONAL HISTORY OF TOBACCO USE: ICD-10-CM

## 2022-11-08 PROCEDURE — 71250 CT THORAX DX C-: CPT

## 2022-11-10 ENCOUNTER — OFFICE VISIT (OUTPATIENT)
Dept: FAMILY MEDICINE CLINIC | Age: 72
End: 2022-11-10
Payer: MEDICARE

## 2022-11-10 VITALS
BODY MASS INDEX: 24.48 KG/M2 | WEIGHT: 171 LBS | HEIGHT: 70 IN | DIASTOLIC BLOOD PRESSURE: 78 MMHG | HEART RATE: 74 BPM | RESPIRATION RATE: 14 BRPM | SYSTOLIC BLOOD PRESSURE: 122 MMHG | OXYGEN SATURATION: 96 %

## 2022-11-10 DIAGNOSIS — Z00.00 ENCOUNTER FOR WELL ADULT EXAM WITHOUT ABNORMAL FINDINGS: Primary | ICD-10-CM

## 2022-11-10 PROCEDURE — 99397 PER PM REEVAL EST PAT 65+ YR: CPT

## 2022-11-10 PROCEDURE — 3078F DIAST BP <80 MM HG: CPT

## 2022-11-10 PROCEDURE — 3074F SYST BP LT 130 MM HG: CPT

## 2022-11-10 PROCEDURE — G8484 FLU IMMUNIZE NO ADMIN: HCPCS

## 2022-11-10 SDOH — ECONOMIC STABILITY: FOOD INSECURITY: WITHIN THE PAST 12 MONTHS, YOU WORRIED THAT YOUR FOOD WOULD RUN OUT BEFORE YOU GOT MONEY TO BUY MORE.: NEVER TRUE

## 2022-11-10 SDOH — ECONOMIC STABILITY: FOOD INSECURITY: WITHIN THE PAST 12 MONTHS, THE FOOD YOU BOUGHT JUST DIDN'T LAST AND YOU DIDN'T HAVE MONEY TO GET MORE.: NEVER TRUE

## 2022-11-10 ASSESSMENT — PATIENT HEALTH QUESTIONNAIRE - PHQ9
SUM OF ALL RESPONSES TO PHQ9 QUESTIONS 1 & 2: 0
7. TROUBLE CONCENTRATING ON THINGS, SUCH AS READING THE NEWSPAPER OR WATCHING TELEVISION: 0
1. LITTLE INTEREST OR PLEASURE IN DOING THINGS: 0
9. THOUGHTS THAT YOU WOULD BE BETTER OFF DEAD, OR OF HURTING YOURSELF: 0
4. FEELING TIRED OR HAVING LITTLE ENERGY: 0
SUM OF ALL RESPONSES TO PHQ QUESTIONS 1-9: 0
8. MOVING OR SPEAKING SO SLOWLY THAT OTHER PEOPLE COULD HAVE NOTICED. OR THE OPPOSITE, BEING SO FIGETY OR RESTLESS THAT YOU HAVE BEEN MOVING AROUND A LOT MORE THAN USUAL: 0
10. IF YOU CHECKED OFF ANY PROBLEMS, HOW DIFFICULT HAVE THESE PROBLEMS MADE IT FOR YOU TO DO YOUR WORK, TAKE CARE OF THINGS AT HOME, OR GET ALONG WITH OTHER PEOPLE: 0
SUM OF ALL RESPONSES TO PHQ QUESTIONS 1-9: 0
2. FEELING DOWN, DEPRESSED OR HOPELESS: 0
5. POOR APPETITE OR OVEREATING: 0
3. TROUBLE FALLING OR STAYING ASLEEP: 0
6. FEELING BAD ABOUT YOURSELF - OR THAT YOU ARE A FAILURE OR HAVE LET YOURSELF OR YOUR FAMILY DOWN: 0
SUM OF ALL RESPONSES TO PHQ QUESTIONS 1-9: 0
SUM OF ALL RESPONSES TO PHQ QUESTIONS 1-9: 0

## 2022-11-10 ASSESSMENT — SOCIAL DETERMINANTS OF HEALTH (SDOH): HOW HARD IS IT FOR YOU TO PAY FOR THE VERY BASICS LIKE FOOD, HOUSING, MEDICAL CARE, AND HEATING?: NOT HARD AT ALL

## 2022-11-10 NOTE — PROGRESS NOTES
Iqra Marques (:  1950) is a 74 y.o. male,Established patient, here for evaluation of the following chief complaint(s):  Establish Care (Pt is here to get established. He has no concerned. )         ASSESSMENT/PLAN:  1. Encounter for well adult exam without abnormal findings    Return in about 6 months (around 5/10/2023), or if symptoms worsen or fail to improve. Subjective   SUBJECTIVE/OBJECTIVE:  Gabi Coles is here today to establish PCP. All past notes, H&P, lab results are [facility] care reviewed at time of appointment. Does have a history of peripheral vascular disease, peripheral neuropathy, depression, right bundle branch block. He denies any current problems at this time. Current medications are reviewed no side effects of any medications are reported at this time. Review of Systems   Constitutional:  Negative for chills, fatigue, fever and unexpected weight change. HENT:  Negative for congestion, ear pain, rhinorrhea, sinus pressure and sinus pain. Eyes:  Negative for discharge, itching and visual disturbance. Respiratory:  Negative for cough, chest tightness, shortness of breath and wheezing. Cardiovascular:  Negative for chest pain, palpitations and leg swelling. Gastrointestinal:  Negative for abdominal pain, constipation, diarrhea and nausea. Endocrine: Negative for cold intolerance, heat intolerance, polydipsia and polyphagia. Genitourinary:  Negative for dysuria, flank pain and frequency. Musculoskeletal:  Negative for arthralgias, back pain and myalgias. Skin:  Negative for color change. Allergic/Immunologic: Negative for environmental allergies and food allergies. Neurological:  Negative for dizziness, weakness, light-headedness, numbness and headaches. Psychiatric/Behavioral:  Negative for agitation, behavioral problems and confusion. The patient is not nervous/anxious. Objective   Physical Exam  Vitals and nursing note reviewed. Constitutional:       General: He is not in acute distress. Appearance: Normal appearance. He is not ill-appearing. HENT:      Head: Normocephalic and atraumatic. Right Ear: Tympanic membrane and external ear normal.      Left Ear: Tympanic membrane and external ear normal.      Nose: Nose normal. No congestion or rhinorrhea. Mouth/Throat:      Mouth: Mucous membranes are moist.      Pharynx: Oropharynx is clear. No posterior oropharyngeal erythema. Eyes:      Pupils: Pupils are equal, round, and reactive to light. Cardiovascular:      Rate and Rhythm: Normal rate and regular rhythm. Pulses: Normal pulses. Heart sounds: Normal heart sounds. Pulmonary:      Effort: Pulmonary effort is normal.      Breath sounds: Normal breath sounds. No wheezing or rhonchi. Abdominal:      General: Bowel sounds are normal.      Palpations: There is no mass. Tenderness: There is no abdominal tenderness. Musculoskeletal:         General: No swelling or tenderness. Normal range of motion. Cervical back: Normal range of motion. No rigidity or tenderness. Skin:     General: Skin is warm and dry. Capillary Refill: Capillary refill takes less than 2 seconds. Coloration: Skin is not pale. Findings: No erythema. Neurological:      General: No focal deficit present. Mental Status: He is alert and oriented to person, place, and time. Psychiatric:         Mood and Affect: Mood normal.         Behavior: Behavior normal.         Thought Content: Thought content normal.         Judgment: Judgment normal.                An electronic signature was used to authenticate this note.     --AIMEE Suazo - CNP

## 2022-11-14 ASSESSMENT — ENCOUNTER SYMPTOMS
WHEEZING: 0
CONSTIPATION: 0
RHINORRHEA: 0
EYE ITCHING: 0
NAUSEA: 0
SINUS PRESSURE: 0
COUGH: 0
DIARRHEA: 0
SINUS PAIN: 0
CHEST TIGHTNESS: 0
EYE DISCHARGE: 0
BACK PAIN: 0
SHORTNESS OF BREATH: 0
ABDOMINAL PAIN: 0
COLOR CHANGE: 0

## 2022-11-14 NOTE — PATIENT INSTRUCTIONS
Learning About Changing a Habit by Setting Goals  How can you change a habit? If you've decided to change a habit--whether it's quitting smoking, lowering your blood pressure, becoming more active, or doing something else to improve your health--congratulations! Making that decision is the first step toward making a change. What happens next? Have a reason. Set goals you can reach. Prepare for slip-ups. And get support. What's your reason? Your reason for wanting to change a habit is really important. Maybe you want to quit smoking so that you can avoid future health problems. Or maybe you want to eat a healthier diet so you can lose weight. If you have high blood pressure, your reason may be clear: to lower your blood pressure. Maybe you smoke and want to save money on cigarettes. You need to feel ready to make a change. If you don't feel ready now, that's okay. You can still be thinking and planning. When you truly want to make changes, you're ready for the next step. It's not easy to change habits--but you can do it. Taking the time to really think about what will motivate or inspire you will help you reach your goals. How do you set goals? Setting goals can help a lot when you're trying to make a healthy change. Focus on small goals. This will help you reach larger goals over time. With smaller goals, you'll have success more often, which will help you stay with it. For example, your large goal may be to lose 20 pounds. Your small goal could be to lose 5. Write down your goals. This will help you remember, and you'll have a clearer idea of what you want to achieve. Use a journal or notebook to record your goals. Hang up your plan where you will see it often as a reminder of what you're trying to do. Make your goals specific. Specific goals help you measure your progress.  For example, setting a goal to eat one extra serving of vegetables a day is better than a general goal to \"eat more vegetables. \"  Focus on one goal at a time. By doing this, you're less likely to feel overwhelmed and then give up. When you reach a goal, reward yourself. Celebrate your new behavior and success for several days, and then think about setting your next goal.  How can you prepare for slip-ups? It's perfectly normal to try to change a habit, go along fine for a while, and then have a setback. Lots of people try and try again before they reach their goals. What are the things that might cause a setback for you? If you have tried to change a habit before, think about what helped you and what got in your way. By thinking about these barriers now, you can plan ahead for how to deal with them if they happen. There will be times when you slip up and don't make your goal for the week. When that happens, don't get mad at yourself. Learn from the experience. Ask yourself what got in the way of reaching your goal. Positive thinking goes a long way when you're making lifestyle changes. How can you get support? Get a partner. It's motivating to know that someone is trying to make the same change that you're making, like being more active or changing your eating habits. You have someone who is counting on you to help them succeed. That person can also remind you how far you've come. Get friends and family involved. They can exercise with you. Or they can encourage you by saying how they admire what you are doing. Family members can join you in your healthy eating efforts. Don't be afraid to tell family and friends that their encouragement makes a big difference to you. Join a class or support group. People in these groups often have some of the same barriers you have. They can give you support when you don't feel like staying with your plan. They can boost your morale when you need a lift. Adri All also find a number of online support groups. Encourage yourself.  When you feel like giving up, don't waste energy feeling bad about yourself. Remember your reason for wanting to change, think about the progress you've made, and give yourself a pep talk and a pat on the back. Get professional help. A dietitian can help you make your diet healthier while still allowing you to eat foods that you enjoy. A  or physical therapist can help design an exercise program that is fun and easy to stay on. A counselor, a , or your doctor can help you overcome hurdles, reduce stress, or quit smoking. Where can you learn more? Go to https://[employer]. org and sign in to your [employer] account. Enter N489 in the Kior box to learn more about \"Learning About Changing a Habit by Setting Goals. \"     If you do not have an account, please click on the \"Sign Up Now\" link. Current as of: February 9, 2022               Content Version: 13.4  © 2006-2022 [employer]. Care instructions adapted under license by [facility] ([facility]). If you have questions about a medical condition or this instruction, always ask your healthcare professional. [place] [address] any warranty or liability for your use of this information.

## 2022-11-21 ENCOUNTER — OFFICE VISIT (OUTPATIENT)
Dept: FAMILY MEDICINE CLINIC | Age: 72
End: 2022-11-21
Payer: MEDICARE

## 2022-11-21 VITALS
WEIGHT: 173 LBS | OXYGEN SATURATION: 95 % | BODY MASS INDEX: 24.82 KG/M2 | HEART RATE: 103 BPM | SYSTOLIC BLOOD PRESSURE: 120 MMHG | DIASTOLIC BLOOD PRESSURE: 60 MMHG

## 2022-11-21 DIAGNOSIS — I10 HYPERTENSION, UNSPECIFIED TYPE: Primary | ICD-10-CM

## 2022-11-21 PROCEDURE — G8427 DOCREV CUR MEDS BY ELIG CLIN: HCPCS

## 2022-11-21 PROCEDURE — 1123F ACP DISCUSS/DSCN MKR DOCD: CPT

## 2022-11-21 PROCEDURE — G8420 CALC BMI NORM PARAMETERS: HCPCS

## 2022-11-21 PROCEDURE — G8484 FLU IMMUNIZE NO ADMIN: HCPCS

## 2022-11-21 PROCEDURE — 3017F COLORECTAL CA SCREEN DOC REV: CPT

## 2022-11-21 PROCEDURE — 1036F TOBACCO NON-USER: CPT

## 2022-11-21 PROCEDURE — 3074F SYST BP LT 130 MM HG: CPT

## 2022-11-21 PROCEDURE — 99213 OFFICE O/P EST LOW 20 MIN: CPT

## 2022-11-21 PROCEDURE — 99212 OFFICE O/P EST SF 10 MIN: CPT

## 2022-11-21 PROCEDURE — 3078F DIAST BP <80 MM HG: CPT

## 2022-11-21 NOTE — PROGRESS NOTES
Jone Go. (:  1950) is a 74 y.o. male,Established patient, here for evaluation of the following chief complaint(s): Other (Patient is here today to discuss the results of his ct low dose of the chest. He now has a growth on his esophagus. )         ASSESSMENT/PLAN:  1. Hypertension, unspecified type [I10 (ICD-10-CM)]  Assessment & Plan:   At goal, continue current treatment plan continue to monitor the for this, report blood pressures to the office. Continue verapamil 180 mg daily. Side effects this medication are discussed and denied at this time. Return in about 6 months (around 2023), or if symptoms worsen or fail to improve. Subjective   SUBJECTIVE/OBJECTIVE:  Ortiz Bruce is here today to discuss his low dose CT results. He had a low-dose CT of his chest, it did show possible aspiration, however he has not had fever, chills, dyspnea, or any other symptoms that would indicate that he has aspiration pneumonia. He states that he does not have trouble swallowing, is able to eat and drink without any difficulties. Does state at times that it would \"go down the wrong tube\" however this is a rare occasion. Swallow studies are discussed, he is to [facility] if they would like this as well. At this time I do not think it is needed, has he has not had any increased symptoms of difficulty swallowing or aspiration pneumonia. We will continue to monitor for this, and if it is noted then we will perform swallow study. Ortiz Bruce verbalized understanding this      Review of Systems   Constitutional:  Negative for chills, fatigue, fever and unexpected weight change. HENT:  Negative for congestion, ear pain, rhinorrhea, sinus pressure and sinus pain. Eyes:  Negative for discharge, itching and visual disturbance. Respiratory:  Negative for cough, chest tightness, shortness of breath and wheezing. Cardiovascular:  Negative for chest pain, palpitations and leg swelling. Gastrointestinal:  Negative for abdominal pain, constipation, diarrhea and nausea. Endocrine: Negative for cold intolerance, heat intolerance, polydipsia and polyphagia. Genitourinary:  Negative for dysuria, flank pain and frequency. Musculoskeletal:  Negative for arthralgias, back pain and myalgias. Skin:  Negative for color change. Allergic/Immunologic: Negative for environmental allergies and food allergies. Neurological:  Negative for dizziness, weakness, light-headedness, numbness and headaches. Psychiatric/Behavioral:  Negative for agitation, behavioral problems and confusion. The patient is not nervous/anxious. Objective   Physical Exam  Vitals and nursing note reviewed. Constitutional:       General: He is not in acute distress. Appearance: Normal appearance. He is not ill-appearing. HENT:      Head: Normocephalic and atraumatic. Right Ear: Tympanic membrane and external ear normal.      Left Ear: Tympanic membrane and external ear normal.      Nose: Nose normal. No congestion or rhinorrhea. Mouth/Throat:      Mouth: Mucous membranes are moist.      Pharynx: Oropharynx is clear. No posterior oropharyngeal erythema. Eyes:      Pupils: Pupils are equal, round, and reactive to light. Cardiovascular:      Rate and Rhythm: Normal rate and regular rhythm. Pulses: Normal pulses. Heart sounds: Normal heart sounds. Pulmonary:      Effort: Pulmonary effort is normal.      Breath sounds: Normal breath sounds. No wheezing or rhonchi. Abdominal:      General: Bowel sounds are normal.      Palpations: There is no mass. Tenderness: There is no abdominal tenderness. Musculoskeletal:         General: No swelling or tenderness. Normal range of motion. Cervical back: Normal range of motion. No rigidity or tenderness. Skin:     General: Skin is warm and dry. Capillary Refill: Capillary refill takes less than 2 seconds.       Coloration: Skin is not pale.      Findings: No erythema. Neurological:      General: No focal deficit present. Mental Status: He is alert and oriented to person, place, and time. Psychiatric:         Mood and Affect: Mood normal.         Behavior: Behavior normal.         Thought Content: Thought content normal.         Judgment: Judgment normal.                An electronic signature was used to authenticate this note.     --AIMEE Davalos - CNP

## 2022-11-22 ASSESSMENT — ENCOUNTER SYMPTOMS
EYE DISCHARGE: 0
WHEEZING: 0
COLOR CHANGE: 0
CHEST TIGHTNESS: 0
SHORTNESS OF BREATH: 0
RHINORRHEA: 0
DIARRHEA: 0
EYE ITCHING: 0
COUGH: 0
BACK PAIN: 0
SINUS PRESSURE: 0
ABDOMINAL PAIN: 0
SINUS PAIN: 0
NAUSEA: 0
CONSTIPATION: 0

## 2022-11-22 NOTE — ASSESSMENT & PLAN NOTE
At goal, continue current treatment plan continue to monitor the for this, report blood pressures to the office. Continue verapamil 180 mg daily. Side effects this medication are discussed and denied at this time.

## 2023-10-10 ENCOUNTER — TELEPHONE (OUTPATIENT)
Dept: ONCOLOGY | Age: 73
End: 2023-10-10

## 2023-10-10 NOTE — TELEPHONE ENCOUNTER
Reviewed CT Lung Screening Reminder List and patient's chart, patient is due and not yet scheduled. Mailed letter to patient.
no

## 2025-06-25 NOTE — CONSULTS
ASSESSMENT:     Lumbar spinal stenosis, severe, with some neurogenic claudication present  - no relief with RF    Good relief with MIKE prior, not as sustained with most recent, repeat as needed    He defers surgery entirely    Mod right greater than left Hip OA    - Excellent relief with the local anesthetic phase of the injection, indicating that the hip is the source of the pain  - Excellent therapeutic sustained relief with steroid, will repeat the steroid injection as needed     Dr Suarez did not recommend ARELY at this time    Left shoulder RTC issues  Following with Dr Martins, offered surgery once A1C improves    BL hand pain  Following with [facility], getting injs    Diabetic neuropathy    Bilateral knee pain status post bilateral TKA  - postoperative x-rays satisfactory  - postoperative synovial fluid analysis unrevealing  - genicular RF without relief    PLAN:  1) Medical Modalities:     GFR 47    Pamelor 25 to 50    Cont tramadol 1 tablets t.i.d. as needed (2 tabs did not help)     2) Interventional Modalities:     L5/S1 ILESI repeat as needed   Do not hold  Aspirin 81  Hold  Plavix need permission    Right vs BL Hip IA repeat as needed  Do not hold  Plavix  Aspirin 81    3) Behavioral Medicine Modalities:    - None    4) Other Modalities:     none    5) Imaging/Labs:   None         HISTORY OF PRESENT ILLNESS:  The patient presents with bilateral knee and calf pain which began 6/21.   No inciting event recalled    The pain does not radiate.      The pain is described as sharp and aching in character.       Patient reports that the pain is worse with activity. The pain is better with rest.    The patient denies numbness.  The patient denies tingling.  The patient reports weakness in the same distribution.    Comorbid conditions:  Hyperlipidemia  Hypertension  Coronary artery disease   Diabetes  CHF with pulmonary hypertension  GERD   YURI   Gout    PAIN COURSE AND PREVIOUS  INTERVENTIONS:  Medications:  Gabapentin 600/600/900  Magnesium    Topicals    Ineffective/not tolerated:  Ketamine gel   Cymbalta   Topamax    Adjuvants:  PT  Lymphedema therapy for LLE  Weight loss clinic deferred    Interventions:    Right L3-5 RF   [place] L3-5 RF  [place] [facility]  [place], Right Genicular RF, 0% relief   22, Left Genicular RF, 0% relief   2023, BL L3-5 RF, 0% relief   10/12/23, BL Hip IA, 90% relief   24 Right hip IA 80% relief  3/6/25 L5-S1 ILESI 80 % relief   25 L5/S1 ILESI with temporary relief but not sustained    PHYSICAL EXAMINATION:    General: Alert and oriented to person  Affect:  No apparent distress  Head: Normocephalic, atraumatic  Neck: No gross asymmetry noted, no masses noted  Eyes: Anicteric; no injection  Ears: No notable scar/lesions/masses  Respiratory: breathing comfortably     MOTOR EXAMINATION:  LOWER EXTREMITY      LEFT RIGHT   Iliopsoas 5/5 5/5   Quadriceps 5/5 5/5   Hamstrings 5/5 5/5   Foot Dorsiflexion 5/5 5/5   Extensor hallucis longus 5/5 5/5   Gastrocnemius 5/5 5/5     Pain in Right groin with hip abduction/adduction/internal rotation/external rotation  absent  Pain in Left   groin with hip abduction/adduction/internal rotation/external rotation  absent      Lumbar facet loading pos     IMAGIN/16 MRI L SPINE    1. Chronic bilateral L5 pars interarticularis defects with   grade II-grade III anterolisthesis of L5 on S1. The canal is   patent at that level. There is severe stenosis of the L5-S1   foramina, left greater than right, with compression of both   exiting L5 nerve roots.   2. Diffuse disc bulge and endplate hypertrophy with   bilateral facet and ligamentum flavum hypertrophy at L2-L3.   Severe canal stenosis and moderate to severe bilateral   foraminal stenosis is noted at that level.   3. Right lateralizing disc bulge at L3-L4 with bilateral   facet and ligamentum flavum hypertrophy. There is severe   canal stenosis with moderate  left and severe right foraminal   stenosis at that level.   4. Severe right and moderate left foraminal stenosis at   L4-L5 with a right lateralizing disc bulge and right greater   than left facet hypertrophy noted at that level.   5. Mild lumbar levoscoliosis.     JACKIE:emw  Narrative    Examination: MRI lumbar spine without contrast.     Clinical information: 62-year-old male with low back pain   that radiates down both legs for one year.     Comparison: No prior MRI examinations are available for   comparison. The examination is correlated with prior   radiographs dated 08/18/2016.     Technique: Sagittal T1-weighted, sagittal T2-weighted, axial   T1-weighted, and axial T2-weighted MR imaging of the lumbar   spine was performed.     Findings:     Alignment: Grade II-grade III anterolisthesis of L5 on S1   related to bilateral L5 pars interarticularis defects..   There is a mild mid lumbar levoscoliosis.     Developmental spinal canal narrowing: There is no   developmental spinal canal narrowing.     Discs: Extensive degenerative disc disease is noted   throughout the lumbar spine with loss of disc height and   long TR signal in all of the discs from L2-L3 through L5-S1.   Long TR signal is also noted in the T11-T12 disc with mild   loss of disc height and a small central disc protrusion at   that level.     Vertebrae: Vertebrae are maintained in height and marrow   signal with the exception of degenerative endplate changes   and the L5 pars defects.     Distal cord: The distal cord has normal signal.  The conus   terminates at T12.       Level-wise findings are as follows:     Imaged lower thoracic levels: There is no significant spinal   canal or foraminal stenosis noted on the sagittal images at   T11-T12 or T12-L1. There is a probable small central disc   protrusion at the T11-T12 level.         L1-L2: There is no significant spinal canal or foraminal   stenosis noted on the sagittal images.         L2-L3: There  124/71 97.9 °F (36.6 °C) Tympanic 74 16 94 % --     Gen: alert and oriented  Head: normorcephalic, atraumatic  Neck: supple  Heart: RRR  Lungs: No audible wheezes  Abdomen: soft  Pelvis: stable  Extremity left lower extremity skin is intact. Some redness with a hematoma noted over the left distal tibia. Toe flexion-extension intact. Sensation is intact. Good capillary refill noted. There is previous lines scott for cellulitis and the redness has significantly decreased. DATA:  CBC:   Lab Results   Component Value Date    WBC 6.0 06/23/2020    HGB 11.3 06/23/2020     06/23/2020     BMP:    Lab Results   Component Value Date     06/23/2020    K 4.4 06/23/2020     06/23/2020    CO2 27 06/23/2020    BUN 15 06/23/2020    CREATININE 0.86 06/23/2020    CALCIUM 9.1 06/23/2020    GLUCOSE 124 06/23/2020     PT/INR:    Lab Results   Component Value Date    PROTIME 21.8 06/23/2020    INR 2.0 06/23/2020     Troponin:  No results found for: [address] [place] [address]    Radiology: No fractures    ASSESSMENT:Principal Problem:    Cellulitis  Active Problems:    Urinary retention  Resolved Problems:    * No resolved hospital problems. *       PLAN:  1) resolving cellulitis. Activities as tolerated. I will see him on as-needed basis.         [place] is a diffuse disc bulge with partially   overhanging endplate hypertrophy. There is bilateral facet   and ligamentum flavum hypertrophy with an effusion in the   right facet joint. Severe canal stenosis is present and   there is moderate to severe bilateral foraminal stenosis   with minimal preserved fat around the exiting L2 nerve   roots.     L3-L4: There is a diffuse disc bulge lateralizing to the   right. There is bilateral facet hypertrophy and ligamentum   flavum hypertrophy with effusions in both facet joints.   There is severe canal stenosis with moderate left and severe   right foraminal stenosis. There is compression of the   exiting right L3 nerve root.     L4-L5: There is a right lateralizing disc bulge and right   greater than left facet hypertrophy with an effusion in the   right facet joint. The canal is not stenotic at the level of   the disc. There is severe right and moderate left foraminal   stenosis with compression of the exiting right L4 nerve   root.         L5-S1: There are chronic bilateral L5 pars interarticularis   defects. There is uncovering of the L5-S1 disc. There is   marked disc height loss with prominent degenerative endplate   changes. There is bilateral L5-S1 facet arthropathy with   hypertrophy of the facet joints. The canal is widened at the   level of the subluxation. There is severe bilateral   foraminal stenosis, left greater than right, with   compression of both exiting L5 nerve roots.         Imaged sacroiliac joints: The imaged sacroiliac joints are   normal.      9/23 bilateral hip  . Mild to moderate bilateral symmetric hip joint space narrowing with no  significant marginal osteophyte formation.